# Patient Record
Sex: FEMALE | Race: WHITE | HISPANIC OR LATINO
[De-identification: names, ages, dates, MRNs, and addresses within clinical notes are randomized per-mention and may not be internally consistent; named-entity substitution may affect disease eponyms.]

---
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
## 2019-03-04 ENCOUNTER — APPOINTMENT (OUTPATIENT)
Dept: PEDIATRIC HEMATOLOGY/ONCOLOGY | Facility: CLINIC | Age: 19
End: 2019-03-04
Payer: COMMERCIAL

## 2019-03-04 ENCOUNTER — OUTPATIENT (OUTPATIENT)
Dept: OUTPATIENT SERVICES | Age: 19
LOS: 1 days | Discharge: HOME | End: 2019-03-04

## 2019-03-04 VITALS
TEMPERATURE: 98.06 F | RESPIRATION RATE: 20 BRPM | WEIGHT: 159.61 LBS | BODY MASS INDEX: 25.96 KG/M2 | SYSTOLIC BLOOD PRESSURE: 129 MMHG | DIASTOLIC BLOOD PRESSURE: 53 MMHG | HEART RATE: 62 BPM | HEIGHT: 65.91 IN | OXYGEN SATURATION: 100 %

## 2019-03-04 PROCEDURE — 99214 OFFICE O/P EST MOD 30 MIN: CPT

## 2019-03-04 NOTE — PHYSICAL EXAM
[Neuro-onc exam] : PERRLA, EOMI, cranial nerves II-XII grossly intact, motor exam 5/5 throughout, sensory exam intact, normal patellar DTRs, no dysmetria, normal gait, no ataxia on tandem gait [Normal] : affect appropriate [100: Normal, no complaints, no evidence of disease.] : 100: Normal, no complaints, no evidence of disease. [de-identified] : hearing aids

## 2019-03-04 NOTE — REASON FOR VISIT
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

## 2019-03-04 NOTE — FAMILY HISTORY
[] :  [Age ___] : Age: [unfilled] [Healthy] : healthy [Full] : full sister [FreeTextEntry2] : elevated cholesterol

## 2019-03-13 ENCOUNTER — APPOINTMENT (OUTPATIENT)
Dept: MRI IMAGING | Facility: CLINIC | Age: 19
End: 2019-03-13
Payer: COMMERCIAL

## 2019-03-13 ENCOUNTER — OUTPATIENT (OUTPATIENT)
Dept: OUTPATIENT SERVICES | Facility: HOSPITAL | Age: 19
LOS: 1 days | End: 2019-03-13
Payer: COMMERCIAL

## 2019-03-13 DIAGNOSIS — C71.3 MALIGNANT NEOPLASM OF PARIETAL LOBE: ICD-10-CM

## 2019-03-13 DIAGNOSIS — Z00.00 ENCOUNTER FOR GENERAL ADULT MEDICAL EXAMINATION WITHOUT ABNORMAL FINDINGS: ICD-10-CM

## 2019-03-13 PROCEDURE — 72156 MRI NECK SPINE W/O & W/DYE: CPT | Mod: 26

## 2019-03-13 PROCEDURE — 70553 MRI BRAIN STEM W/O & W/DYE: CPT | Mod: 26

## 2019-03-13 PROCEDURE — 70553 MRI BRAIN STEM W/O & W/DYE: CPT

## 2019-03-13 PROCEDURE — 72156 MRI NECK SPINE W/O & W/DYE: CPT

## 2019-03-13 PROCEDURE — A9585: CPT

## 2019-03-14 ENCOUNTER — APPOINTMENT (OUTPATIENT)
Dept: MRI IMAGING | Facility: CLINIC | Age: 19
End: 2019-03-14

## 2019-06-14 ENCOUNTER — FORM ENCOUNTER (OUTPATIENT)
Age: 19
End: 2019-06-14

## 2019-06-15 ENCOUNTER — OUTPATIENT (OUTPATIENT)
Dept: OUTPATIENT SERVICES | Facility: HOSPITAL | Age: 19
LOS: 1 days | End: 2019-06-15
Payer: COMMERCIAL

## 2019-06-15 ENCOUNTER — APPOINTMENT (OUTPATIENT)
Dept: MRI IMAGING | Facility: CLINIC | Age: 19
End: 2019-06-15
Payer: COMMERCIAL

## 2019-06-15 DIAGNOSIS — H91.90 UNSPECIFIED HEARING LOSS, UNSPECIFIED EAR: ICD-10-CM

## 2019-06-15 PROCEDURE — 70553 MRI BRAIN STEM W/O & W/DYE: CPT | Mod: 26

## 2019-06-15 PROCEDURE — A9585: CPT

## 2019-06-15 PROCEDURE — 70553 MRI BRAIN STEM W/O & W/DYE: CPT

## 2019-07-12 ENCOUNTER — APPOINTMENT (OUTPATIENT)
Dept: PREADMISSION TESTING | Facility: CLINIC | Age: 19
End: 2019-07-12

## 2019-07-17 ENCOUNTER — OTHER (OUTPATIENT)
Age: 19
End: 2019-07-17

## 2019-07-23 ENCOUNTER — APPOINTMENT (OUTPATIENT)
Dept: MRI IMAGING | Facility: CLINIC | Age: 19
End: 2019-07-23

## 2019-07-26 ENCOUNTER — APPOINTMENT (OUTPATIENT)
Dept: PREADMISSION TESTING | Facility: CLINIC | Age: 19
End: 2019-07-26
Payer: COMMERCIAL

## 2019-07-26 VITALS
SYSTOLIC BLOOD PRESSURE: 112 MMHG | DIASTOLIC BLOOD PRESSURE: 76 MMHG | WEIGHT: 155.43 LBS | TEMPERATURE: 98.06 F | HEART RATE: 66 BPM | HEIGHT: 65.67 IN | BODY MASS INDEX: 25.28 KG/M2

## 2019-07-26 DIAGNOSIS — G93.0 CEREBRAL CYSTS: ICD-10-CM

## 2019-07-26 DIAGNOSIS — Z01.818 ENCOUNTER FOR OTHER PREPROCEDURAL EXAMINATION: ICD-10-CM

## 2019-07-26 PROCEDURE — 99205 OFFICE O/P NEW HI 60 MIN: CPT

## 2019-07-29 LAB
ANION GAP SERPL CALC-SCNC: 15 MMOL/L
APTT BLD: 36.1 SEC
APTT IMM NP/PRE NP PPP: NORMAL
BASOPHILS # BLD AUTO: 0.05 K/UL
BASOPHILS NFR BLD AUTO: 1.1 %
BUN SERPL-MCNC: 10 MG/DL
CALCIUM SERPL-MCNC: 9.6 MG/DL
CHLORIDE SERPL-SCNC: 102 MMOL/L
CO2 SERPL-SCNC: 24 MMOL/L
CREAT SERPL-MCNC: 0.7 MG/DL
EOSINOPHIL # BLD AUTO: 0.14 K/UL
EOSINOPHIL NFR BLD AUTO: 3 %
GLUCOSE SERPL-MCNC: 82 MG/DL
HCG SERPL-MCNC: <1 MIU/ML
HCT VFR BLD CALC: 42.4 %
HGB BLD-MCNC: 13.4 G/DL
IMM GRANULOCYTES NFR BLD AUTO: 0.2 %
INR PPP: 1.03 RATIO
LYMPHOCYTES # BLD AUTO: 1.98 K/UL
LYMPHOCYTES NFR BLD AUTO: 42.1 %
MAN DIFF?: NORMAL
MCHC RBC-ENTMCNC: 31.6 GM/DL
MCHC RBC-ENTMCNC: 31.6 PG
MCV RBC AUTO: 100 FL
MONOCYTES # BLD AUTO: 0.37 K/UL
MONOCYTES NFR BLD AUTO: 7.9 %
NEUTROPHILS # BLD AUTO: 2.15 K/UL
NEUTROPHILS NFR BLD AUTO: 45.7 %
NPP NORMAL POOLED PLASMA: NORMAL SECS
PLATELET # BLD AUTO: 283 K/UL
POTASSIUM SERPL-SCNC: 4.7 MMOL/L
PT BLD: 11.7 SEC
RBC # BLD: 4.24 M/UL
RBC # FLD: 12.3 %
SODIUM SERPL-SCNC: 141 MMOL/L
WBC # FLD AUTO: 4.7 K/UL

## 2019-08-02 LAB
ABO + RH PNL BLD: NORMAL
BLD GP AB SCN SERPL QL: NORMAL

## 2019-08-05 ENCOUNTER — TRANSCRIPTION ENCOUNTER (OUTPATIENT)
Age: 19
End: 2019-08-05

## 2019-08-06 ENCOUNTER — INPATIENT (INPATIENT)
Age: 19
LOS: 1 days | Discharge: HOME | End: 2019-08-08
Attending: NEUROLOGICAL SURGERY | Admitting: NEUROLOGICAL SURGERY
Payer: COMMERCIAL

## 2019-08-06 VITALS
TEMPERATURE: 99 F | HEART RATE: 80 BPM | RESPIRATION RATE: 16 BRPM | HEIGHT: 65.67 IN | WEIGHT: 155.43 LBS | SYSTOLIC BLOOD PRESSURE: 108 MMHG | OXYGEN SATURATION: 97 % | DIASTOLIC BLOOD PRESSURE: 72 MMHG

## 2019-08-06 DIAGNOSIS — C71.9 MALIGNANT NEOPLASM OF BRAIN, UNSPECIFIED: Chronic | ICD-10-CM

## 2019-08-06 DIAGNOSIS — Z48.811 ENCOUNTER FOR SURGICAL AFTERCARE FOLLOWING SURGERY ON THE NERVOUS SYSTEM: ICD-10-CM

## 2019-08-06 DIAGNOSIS — G93.0 CEREBRAL CYSTS: ICD-10-CM

## 2019-08-06 DIAGNOSIS — H91.93 UNSPECIFIED HEARING LOSS, BILATERAL: ICD-10-CM

## 2019-08-06 DIAGNOSIS — C71.9 MALIGNANT NEOPLASM OF BRAIN, UNSPECIFIED: ICD-10-CM

## 2019-08-06 LAB
BLD GP AB SCN SERPL QL: NEGATIVE — SIGNIFICANT CHANGE UP
CLARITY CSF: SIGNIFICANT CHANGE UP
COLOR CSF: SIGNIFICANT CHANGE UP
GLUCOSE CSF-MCNC: 4 MG/DL — LOW (ref 40–70)
GRAM STN CSF: SIGNIFICANT CHANGE UP
LYMPHOCYTES # CSF: 75 % — SIGNIFICANT CHANGE UP
MONOCYTES # CSF: 25 % — SIGNIFICANT CHANGE UP
NRBC NFR CSF: 1 CELL/UL — SIGNIFICANT CHANGE UP (ref 0–5)
PROT CSF-MCNC: 4.7 MG/DL — LOW (ref 15–40)
RBC # CSF: 811 CELL/UL — HIGH (ref 0–0)
RH IG SCN BLD-IMP: POSITIVE — SIGNIFICANT CHANGE UP
SPECIMEN SOURCE: SIGNIFICANT CHANGE UP
TOTAL CELLS COUNTED, SPINAL FLUID: 8 CELLS — SIGNIFICANT CHANGE UP
XANTHOCHROMIA: SIGNIFICANT CHANGE UP

## 2019-08-06 PROCEDURE — 99291 CRITICAL CARE FIRST HOUR: CPT

## 2019-08-06 RX ORDER — DEXAMETHASONE 0.5 MG/5ML
4 ELIXIR ORAL EVERY 6 HOURS
Refills: 0 | Status: DISCONTINUED | OUTPATIENT
Start: 2019-08-06 | End: 2019-08-07

## 2019-08-06 RX ORDER — ONDANSETRON 8 MG/1
4 TABLET, FILM COATED ORAL ONCE
Refills: 0 | Status: DISCONTINUED | OUTPATIENT
Start: 2019-08-06 | End: 2019-08-06

## 2019-08-06 RX ORDER — ACETAMINOPHEN 500 MG
650 TABLET ORAL EVERY 6 HOURS
Refills: 0 | Status: DISCONTINUED | OUTPATIENT
Start: 2019-08-06 | End: 2019-08-06

## 2019-08-06 RX ORDER — CEFAZOLIN SODIUM 1 G
2000 VIAL (EA) INJECTION EVERY 8 HOURS
Refills: 0 | Status: DISCONTINUED | OUTPATIENT
Start: 2019-08-06 | End: 2019-08-07

## 2019-08-06 RX ORDER — ACETAMINOPHEN 500 MG
650 TABLET ORAL EVERY 6 HOURS
Refills: 0 | Status: DISCONTINUED | OUTPATIENT
Start: 2019-08-06 | End: 2019-08-08

## 2019-08-06 RX ORDER — FENTANYL CITRATE 50 UG/ML
25 INJECTION INTRAVENOUS
Refills: 0 | Status: DISCONTINUED | OUTPATIENT
Start: 2019-08-06 | End: 2019-08-06

## 2019-08-06 RX ORDER — OXYCODONE HYDROCHLORIDE 5 MG/1
5 TABLET ORAL EVERY 4 HOURS
Refills: 0 | Status: DISCONTINUED | OUTPATIENT
Start: 2019-08-06 | End: 2019-08-08

## 2019-08-06 RX ORDER — FENTANYL CITRATE 50 UG/ML
50 INJECTION INTRAVENOUS
Refills: 0 | Status: DISCONTINUED | OUTPATIENT
Start: 2019-08-06 | End: 2019-08-06

## 2019-08-06 RX ADMIN — OXYCODONE HYDROCHLORIDE 5 MILLIGRAM(S): 5 TABLET ORAL at 21:21

## 2019-08-06 RX ADMIN — Medication 650 MILLIGRAM(S): at 23:52

## 2019-08-06 RX ADMIN — Medication 650 MILLIGRAM(S): at 19:30

## 2019-08-06 RX ADMIN — OXYCODONE HYDROCHLORIDE 5 MILLIGRAM(S): 5 TABLET ORAL at 20:51

## 2019-08-06 RX ADMIN — Medication 200 MILLIGRAM(S): at 23:09

## 2019-08-06 RX ADMIN — Medication 650 MILLIGRAM(S): at 19:02

## 2019-08-06 RX ADMIN — Medication 4 MILLIGRAM(S): at 22:20

## 2019-08-06 NOTE — PROGRESS NOTE PEDS - SUBJECTIVE AND OBJECTIVE BOX
Interval/Overnight Events: s/p fenestration of cyst in OR. EBL 50 ml.    ===========================RESPIRATORY==========================  RR: 19 (08-06-19 @ 20:27) (10 - 30)  SpO2: 99% (08-06-19 @ 20:27) (95% - 99%)    Respiratory Support: RA  [x] Airway Clearance Discussed  Extubation Readiness:  [x ] Not Applicable     [ ] Discussed and Assessed  Comments:    =========================CARDIOVASCULAR========================  HR: 92 (08-06-19 @ 20:27) (80 - 112)  BP: 109/60 (08-06-19 @ 20:27) (108/72 - 118/50)  Patient Care Access: PIV  Comments:    =====================HEMATOLOGY/ONCOLOGY=====================  Transfusions:	[ ] PRBC	[ ] Platelets	[ ] FFP		[ ] Cryoprecipitate  DVT Prophylaxis: SCDs  Comments:    ========================INFECTIOUS DISEASE=======================  T(C): 36.4 (08-06-19 @ 20:27), Max: 37.4 (08-06-19 @ 12:18)  T(F): 97.5 (08-06-19 @ 20:27), Max: 98.6 (08-06-19 @ 18:05)  [ ] Cooling Hawkins being used. Target Temperature:    ceFAZolin  IV Intermittent - Peds 2000 milliGRAM(s) IV Intermittent every 8 hours    ==================FLUIDS/ELECTROLYTES/NUTRITION=================  I&O's Summary    06 Aug 2019 07:01  -  06 Aug 2019 22:31  --------------------------------------------------------  IN: 200 mL / OUT: 0 mL / NET: 200 mL    Diet: Regular  [ ] NGT		[ ] NDT		[ ] GT		[ ] GJT    ==========================NEUROLOGY===========================  [ ] SBS:		[ ] PIPPA-1:	[ ] BIS:	[ ] CAPD:  acetaminophen   Oral Tab/Cap - Peds. 650 milliGRAM(s) Oral every 6 hours  oxyCODONE   IR Oral Tab/Cap - Peds 5 milliGRAM(s) Oral every 4 hours PRN  [x] Adequacy of sedation and pain control has been assessed and adjusted  Comments:    OTHER MEDICATIONS:  dexamethasone IV Intermittent - Pediatric 4 milliGRAM(s) IV Intermittent every 6 hours    =========================PATIENT CARE==========================  [ ] There are pressure ulcers/areas of breakdown that are being addressed.  [x] Preventative measures are being taken to decrease risk for skin breakdown.  [x] Necessity of urinary, arterial, and venous catheters discussed    =========================PHYSICAL EXAM=========================  GENERAL: In no acute distress  RESPIRATORY: Lungs clear to auscultation bilaterally. Good aeration. No rales, rhonchi, retractions or wheezing. Effort even and unlabored.  CARDIOVASCULAR: Regular rate and rhythm. Normal S1/S2. No murmurs, rubs, or gallop. Capillary refill < 2 seconds. Distal pulses 2+ and equal.  ABDOMEN: Soft, non-distended. Bowel sounds present. No palpable hepatosplenomegaly.  SKIN: No rash. Wound at surgical site CDI.  EXTREMITIES: Warm and well perfused. No gross extremity deformities.  NEUROLOGIC: Alert and oriented. CN 2-7, 10-12 intact. Patient is deaf. Normal motor and sensation.    ===============================================================  Parent/Guardian is at the bedside:	[x ] Yes	[ ] No  Patient and Parent/Guardian updated as to the progress/plan of care:	[x ] Yes	[ ] No    [ x] The patient remains in critical and unstable condition, and requires ICU care and monitoring, total critical care time spent by myself, the attending physician was 35 minutes, excluding procedure time.  [ ] The patient is improving but requires continued monitoring and adjustment of therapy

## 2019-08-06 NOTE — ASU PATIENT PROFILE, PEDIATRIC - PSH
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

## 2019-08-06 NOTE — ASU PATIENT PROFILE, PEDIATRIC - PMH
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

## 2019-08-06 NOTE — PATIENT PROFILE PEDIATRIC. - VISION (WITH CORRECTIVE LENSES IF THE PATIENT USUALLY WEARS THEM):
uses glasses/Normal vision: sees adequately in most situations; can see medication labels, newsprint

## 2019-08-06 NOTE — PATIENT PROFILE PEDIATRIC. - ABILITY TO HEAR (WITH HEARING AID OR HEARING APPLIANCE IF NORMALLY USED):
uses B/L hearing aids/Mildly to Moderately Impaired: difficulty hearing in some environments or speaker may need to increase volume or speak distinctly

## 2019-08-06 NOTE — PROGRESS NOTE PEDS - ASSESSMENT
18 yof with congenital deafness and ependymoma resection in 2012, here because of headaches and a cyst at the site of the previous tumor resection. S/p fenestration of cyst on 8/6.    Stable from a resp and HD standpoint. Cont to monitor.  ADAT.  Pain control with Tylenol, Oxycodone  Decadron taper per neurosurgery  Neuro checks  Periop antibiotics  Following with neurosurgery.

## 2019-08-06 NOTE — PROGRESS NOTE PEDS - ASSESSMENT
17 y/o f pmhx ependymoma s/p GTR 8/2012 s/p endoscopic resection of intraventricular arachnoid cyst. Post op neurologically intact. tolerating po fluids.

## 2019-08-06 NOTE — PROGRESS NOTE PEDS - SUBJECTIVE AND OBJECTIVE BOX
SUBJECTIVE EVENTS: PO check Note:  patient reports feeling well, has mild headache otherwise no complaints.     Vital Signs Last 24 Hrs  T(C): 37 (06 Aug 2019 19:00), Max: 37.4 (06 Aug 2019 12:18)  T(F): 98.6 (06 Aug 2019 19:00), Max: 98.6 (06 Aug 2019 18:05)  HR: 84 (06 Aug 2019 19:15) (80 - 112)  BP: 118/50 (06 Aug 2019 19:00) (108/72 - 118/50)  BP(mean): 65 (06 Aug 2019 19:00) (64 - 71)  RR: 16 (06 Aug 2019 19:15) (10 - 30)  SpO2: 97% (06 Aug 2019 19:15) (95% - 97%)      PHYSICAL EXAM:  Awake Alert Age Appropriate, fc  PERRL, EOMI, No facial droop, Tongue midline  b/l hearing aids.  Normal Tone 5/5 strength equally      INCISION: clean, dry, no active dc.      DIET: advance to regular as tolerated.       MEDICATIONS  (STANDING):  ceFAZolin  IV Intermittent - Peds 2000 milliGRAM(s) IV Intermittent every 8 hours  dexamethasone IV Intermittent - Pediatric 4 milliGRAM(s) IV Intermittent every 6 hours    MEDICATIONS  (PRN):  acetaminophen   Oral Tab/Cap - Peds. 650 milliGRAM(s) Oral every 6 hours PRN Temp greater or equal to 38 C (100.4 F), Mild Pain (1 - 3)  acetaminophen   Oral Tab/Cap - Peds. 650 milliGRAM(s) Oral every 6 hours PRN Mild Pain (1 - 3)  fentaNYL    IV Intermittent - Peds 25 MICROGram(s) IV Intermittent every 10 minutes PRN Moderate Pain (4 - 6)  fentaNYL    IV Intermittent - Peds 50 MICROGram(s) IV Intermittent every 10 minutes PRN Severe Pain (7 - 10)  ondansetron IV Intermittent - Peds 4 milliGRAM(s) IV Intermittent once PRN Nausea and/or Vomiting  oxyCODONE   IR Oral Tab/Cap - Peds 5 milliGRAM(s) Oral every 4 hours PRN Moderate Pain (4 - 6)

## 2019-08-06 NOTE — BRIEF OPERATIVE NOTE - NSICDXBRIEFPROCEDURE_GEN_ALL_CORE_FT
PROCEDURES:  Percutaneous endoscopic removal of solid matter from ventricular septum 06-Aug-2019 17:50:25  Elvin Estrada

## 2019-08-06 NOTE — PROGRESS NOTE PEDS - PROBLEM SELECTOR PLAN 1
- pacu to picu.  - q1 neuro checks in picu.  - ancef x 24hrs.  - decadron 4q6h taper over 5 days.  - Mri brain w/wo tomorrow.  - pain control.  - advance diet as tolerated.     d/w attending.

## 2019-08-07 ENCOUNTER — TRANSCRIPTION ENCOUNTER (OUTPATIENT)
Age: 19
End: 2019-08-07

## 2019-08-07 PROCEDURE — 99232 SBSQ HOSP IP/OBS MODERATE 35: CPT

## 2019-08-07 PROCEDURE — 70450 CT HEAD/BRAIN W/O DYE: CPT | Mod: 26,GC

## 2019-08-07 RX ORDER — ACETAMINOPHEN 500 MG
2 TABLET ORAL
Qty: 0 | Refills: 0 | DISCHARGE
Start: 2019-08-07

## 2019-08-07 RX ORDER — OXYCODONE HYDROCHLORIDE 5 MG/1
1 TABLET ORAL
Qty: 10 | Refills: 0
Start: 2019-08-07

## 2019-08-07 RX ORDER — DEXAMETHASONE 0.5 MG/5ML
4 ELIXIR ORAL EVERY 6 HOURS
Refills: 0 | Status: DISCONTINUED | OUTPATIENT
Start: 2019-08-07 | End: 2019-08-07

## 2019-08-07 RX ORDER — DEXAMETHASONE 0.5 MG/5ML
4 ELIXIR ORAL EVERY 6 HOURS
Refills: 0 | Status: COMPLETED | OUTPATIENT
Start: 2019-08-07 | End: 2019-08-07

## 2019-08-07 RX ORDER — DEXAMETHASONE 0.5 MG/5ML
3 ELIXIR ORAL EVERY 8 HOURS
Refills: 0 | Status: DISCONTINUED | OUTPATIENT
Start: 2019-08-08 | End: 2019-08-08

## 2019-08-07 RX ORDER — DEXAMETHASONE 0.5 MG/5ML
1 ELIXIR ORAL
Qty: 24 | Refills: 0
Start: 2019-08-07

## 2019-08-07 RX ADMIN — OXYCODONE HYDROCHLORIDE 5 MILLIGRAM(S): 5 TABLET ORAL at 17:47

## 2019-08-07 RX ADMIN — OXYCODONE HYDROCHLORIDE 5 MILLIGRAM(S): 5 TABLET ORAL at 02:25

## 2019-08-07 RX ADMIN — Medication 4 MILLIGRAM(S): at 11:24

## 2019-08-07 RX ADMIN — Medication 4 MILLIGRAM(S): at 22:09

## 2019-08-07 RX ADMIN — Medication 4 MILLIGRAM(S): at 03:43

## 2019-08-07 RX ADMIN — Medication 650 MILLIGRAM(S): at 23:34

## 2019-08-07 RX ADMIN — Medication 200 MILLIGRAM(S): at 06:40

## 2019-08-07 RX ADMIN — Medication 650 MILLIGRAM(S): at 11:50

## 2019-08-07 RX ADMIN — Medication 650 MILLIGRAM(S): at 18:00

## 2019-08-07 RX ADMIN — Medication 650 MILLIGRAM(S): at 00:22

## 2019-08-07 RX ADMIN — Medication 650 MILLIGRAM(S): at 06:21

## 2019-08-07 RX ADMIN — OXYCODONE HYDROCHLORIDE 5 MILLIGRAM(S): 5 TABLET ORAL at 01:55

## 2019-08-07 RX ADMIN — Medication 650 MILLIGRAM(S): at 06:51

## 2019-08-07 RX ADMIN — Medication 650 MILLIGRAM(S): at 12:00

## 2019-08-07 RX ADMIN — Medication 4 MILLIGRAM(S): at 17:07

## 2019-08-07 RX ADMIN — OXYCODONE HYDROCHLORIDE 5 MILLIGRAM(S): 5 TABLET ORAL at 07:53

## 2019-08-07 RX ADMIN — Medication 650 MILLIGRAM(S): at 17:27

## 2019-08-07 NOTE — PROGRESS NOTE PEDS - ASSESSMENT
19 y/o F pmhx ependymoma s/p GTR 8/2012 s/p endoscopic resection of intraventricular arachnoid cyst POD#1. Post op neurologically intact    PLAN:   - q1 neuro checks  - ancef x 24hrs  - decadron 4q6h taper over 5 days - 4q6 x 1 day, 3q8 x 1 day, 2 q12 x 1 day, 1q12 x 1 day, 1QD x 1 day   - Mri brain w/wo today  - advance diet as tolerated

## 2019-08-07 NOTE — DISCHARGE NOTE PROVIDER - NSDCCPCAREPLAN_GEN_ALL_CORE_FT
PRINCIPAL DISCHARGE DIAGNOSIS  Diagnosis: Cyst of brain  Assessment and Plan of Treatment: Cyst of brain PRINCIPAL DISCHARGE DIAGNOSIS  Diagnosis: Cyst of brain  Assessment and Plan of Treatment: 1. Remove top surgical dressing on post operative day 3 unless it was removed by the surgical team prior to your discharge. Incision should be left uncovered after day 3.   2. Begin showering with shampoo on post operative day 4. Avoid long soaks and do not submerge incision in bathtub. Regular shower only and allow soap and water to run over the incision. Pat incision area dry with clean towel- do not scrub. Please shower regularly to ensure incision stays clean to avoid post operative infections.   3. Notify your surgeon if you notice increased redness, drainage or you notice incision area opening.   4. Return to ER immediately for high fevers, severe headache, vomiting, lethargy or  weakness  5. Please call your neurosurgeon following discharge to make follow up appointment in 1 week after discharge unless otherwise specified. See Contact information below.   6. Prescription Post operative medication, if applicable,  are sent to [facility](unless another pharmacy specified)- [facility] is located in [facility] [employer] [facility]. All post operative prescrptions should be picked up before departing the hospital  7. Ambulate as tolerate. Continue with all "activities of daily living". Avoid strenuous activity or lifting more than 10 pounds until cleared for additional activity at your follow up appointment  8. Do not return to work or school until cleared by your neurosurgeon at your follow up visit unless specified to you during your hospital stay

## 2019-08-07 NOTE — DISCHARGE NOTE PROVIDER - NSDCFUADDINST_GEN_ALL_CORE_FT
1. Surgical dressing will fall off on it's own in time. Do not scratch or pick at dressing, be careful when brushing hair.   2. Begin showering with shampoo on post operative day 4. Avoid long soaks and do not submerge incision in water. Regular shower only and allow soap and water to run over the incision. Pat incision area dry with clean towel- do not scrub. Please shower regularly to ensure incision stays clean to avoid post operative infections.   3. Notify your surgeon if you notice increased redness, drainage or you notice incision area opening.   4. Return to ER immediately for high fevers, severe headache, vomiting, lethargy or weakness  5. Please call your neurosurgeon following discharge to make follow up appointment in 1 week after discharge unless otherwise specified. See contact information.  6. Prescription post operative medication has been sent to [email or web address]. [email or web address] is located in [facility] [employer] [facility]. All post operative prescriptions should be picked up before departing the hospital.  7. Ambulate as tolerate. Continue with all "activities of daily living." Avoid strenuous activity or lifting more than 10 pounds until cleared for additional activity at your follow up appointment.  8. Do not return to work or school until cleared by your neurosurgeon at your follow up visit unless specified to you during your hospital stay

## 2019-08-07 NOTE — PROGRESS NOTE PEDS - ASSESSMENT
18 yof with congenital deafness and ependymoma resection in 2012, here because of headaches and a cyst at the site of the previous tumor resection. S/p fenestration of cyst on 8/6.    Stable from a resp and HD standpoint. Cont to monitor.  ADAT.  Pain control with Tylenol, Oxycodone  Decadron taper per neurosurgery  Neuro checks  Periop antibiotics  Following with neurosurgery. 18 yof with congenital deafness and ependymoma resection in 2012, here because of headaches and a cyst at the site of the previous tumor resection. S/p fenestration of cyst on 8/6.    Plan:  Doing well post craniotomy  Continue neurochecks  MRI today  OOB and ambulate  Encourage PO  Continue to monitor for pain and give pain meds as needed  Neurosurgery co-management  anticipate possible transfer to floor later today.

## 2019-08-07 NOTE — PROGRESS NOTE PEDS - SUBJECTIVE AND OBJECTIVE BOX
POST ANESTHESIA EVALUATION    18y Female POSTOP DAY 1 S/P     MENTAL STATUS: Patient participation [  x] Awake     [  ] Arousable     [  ] Sedated    AIRWAY PATENCY: [ x ] Satisfactory  [  ] Other:     Vital Signs Last 24 Hrs  T(C): 36.7 (07 Aug 2019 07:48), Max: 37.4 (06 Aug 2019 12:18)  T(F): 98 (07 Aug 2019 07:48), Max: 98.6 (06 Aug 2019 18:05)  HR: 60 (07 Aug 2019 07:48) (53 - 112)  BP: 107/58 (07 Aug 2019 07:48) (100/40 - 118/50)  BP(mean): 74 (07 Aug 2019 07:48) (58 - 75)  RR: 19 (07 Aug 2019 07:48) (10 - 30)  SpO2: 98% (07 Aug 2019 07:48) (95% - 99%)  I&O's Summary    06 Aug 2019 07:01  -  07 Aug 2019 07:00  --------------------------------------------------------  IN: 1034 mL / OUT: 1950 mL / NET: -916 mL          NAUSEA/ VOMITTING:  [ x ] NONE  [  ] CONTROLLED [  ] OTHER     PAIN: [x  ] CONTROLLED WITH CURRENT REGIMEN  [  ] OTHER    [ x ] NO APPARENT ANESTHESIA COMPLICATIONS      Comments:

## 2019-08-07 NOTE — DISCHARGE NOTE PROVIDER - NSDCCPTREATMENT_GEN_ALL_CORE_FT
PRINCIPAL PROCEDURE  Procedure: Percutaneous endoscopic removal of solid matter from ventricular septum  Findings and Treatment:

## 2019-08-07 NOTE — DISCHARGE NOTE PROVIDER - HOSPITAL COURSE
19 y/o F pmhx ependymoma s/p GTR 8/2012 s/p endoscopic resection of intraventricular arachnoid cyst on 8/6/19. Post op neurologically intact, tolerating PO intake, p/o MRI shows _____. Patient stable for discharge home. 17 y/o F pmhx ependymoma s/p GTR 8/2012 s/p endoscopic resection of intraventricular arachnoid cyst on 8/6/19. Post op neurologically intact, tolerating PO intake, p/o MRI showed decreased size of the surgically fenestrated cyst compared to preoperative imaging . Patient stable for discharge home.

## 2019-08-07 NOTE — PROGRESS NOTE PEDS - SUBJECTIVE AND OBJECTIVE BOX
Interval/Overnight Events:    VITAL SIGNS:  T(C): 36.7 (08-07-19 @ 07:48), Max: 37.4 (08-06-19 @ 12:18)  HR: 60 (08-07-19 @ 07:48) (53 - 112)  BP: 107/58 (08-07-19 @ 07:48) (100/40 - 118/50)  ABP: --  ABP(mean): --  RR: 19 (08-07-19 @ 07:48) (10 - 30)  SpO2: 98% (08-07-19 @ 07:48) (95% - 99%)  CVP(mm Hg): --  End-Tidal CO2:          ===========================RESPIRATORY==========================  [ ] FiO2: ___ 	[ ] Heliox: ____ 		[ ] BiPAP: ___   [ ] NC: __  Liters			[ ] HFNC: __ 	Liters, FiO2: __  [ ] Mechanical Ventilation:   [ ] Inhaled Nitric Oxide:          [ ] Extubation Readiness Assessed  Comments:    =========================CARDIOVASCULAR========================  NIRS:      Chest Tube Output: ___ in 24 hours, ___ in last 12 hours     [ ] Right     [ ] Left    [ ] Mediastinal    Cardiac Rhythm:	[x] NSR		[ ] Other:    [ ] Central Venous Line			                         Placed:   [ ] Arterial Line		                                                 Placed:   [ ] PICC:				[ ] Broviac		                 [ ] Mediport  Comments:    =====================HEMATOLOGY/ONCOLOGY=====================  Transfusions: 	[ ] PRBC	[ ] Platelets	[ ] FFP		[ ] Cryoprecipitate  DVT Prophylaxis:      Comments:    ========================INFECTIOUS DISEASE=======================  [ ] Cooling [place] being used. Target Temperature:     RECENT CULTURES:  08-06 @ 23:15 CEREBRAL SPINAL FLUID               ==================FLUIDS/ELECTROLYTES/NUTRITION=================  I&O's Summary    06 Aug 2019 07:01  -  07 Aug 2019 07:00  --------------------------------------------------------  IN: 1034 mL / OUT: 1950 mL / NET: -916 mL      Daily Weight Gm: 91102 (06 Aug 2019 20:27)    Diet:	[ ] Regular	[ ] Soft		[ ] Clears   	[ ] NPO  .	        [ ] Other:  .	        [ ] NGT		[ ] NDT		[ ] GT		[ ] GJT          [ ] Urinary Catheter, Date Placed:   Comments:    ==========================NEUROLOGY===========================  [ ] SBS:		[ ] PIPPA-1:	[ ] BIS:	[ ] CAPD:  [ ] EVD set at: ___ , Drainage in last 24 hours: ___ ml    acetaminophen   Oral Tab/Cap - Peds. 650 milliGRAM(s) Oral every 6 hours  oxyCODONE   IR Oral Tab/Cap - Peds 5 milliGRAM(s) Oral every 4 hours PRN    [x] Adequacy of sedation and pain control has been assessed and adjusted  Comments:    OTHER MEDICATIONS:  ceFAZolin  IV Intermittent - Peds 2000 milliGRAM(s) IV Intermittent every 8 hours  dexamethasone IV Intermittent - Pediatric 4 milliGRAM(s) IV Intermittent every 6 hours      =========================PATIENT CARE==========================  [ ] There are pressure ulcers/areas of breakdown that are being addressed.  [x] Preventative measures are being taken to decrease risk for skin breakdown.  [x] Necessity of urinary, arterial, and venous catheters discussed    =========================PHYSICAL EXAM=========================  GENERAL:   RESPIRATORY:   CARDIOVASCULAR:   ABDOMEN:   SKIN:   EXTREMITIES:   NEUROLOGIC:     ===============================================================    IMAGING STUDIES:    Parent/Guardian is at the bedside:	[ ] Yes	[ ] No  Patient and Parent/Guardian updated as to the progress/plan of care:	[ ] Yes	[ ] No    [ ] The patient remains in critical and unstable condition, and requires ICU care and monitoring.  Total critical care time spent by the attending physician was _____ minutes, excluding procedure time.    [ ] The patient is improving but requires continued monitoring and adjustment of therapy.  Total critical care time spent by the attending physician at bedside was _____ minutes, excluding procedure time. 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  s/p  craniotomy and fenestration.  Afebrile overnight.  No emesis.  Tolerating regular diet.    VITAL SIGNS:  T(C): 36.7 (08-07-19 @ 07:48), Max: 37.4 (08-06-19 @ 12:18)  HR: 60 (08-07-19 @ 07:48) (53 - 112)  BP: 107/58 (08-07-19 @ 07:48) (100/40 - 118/50)  ABP: --  ABP(mean): --  RR: 19 (08-07-19 @ 07:48) (10 - 30)  SpO2: 98% (08-07-19 @ 07:48) (95% - 99%)  CVP(mm Hg): --  End-Tidal CO2:          ===========================RESPIRATORY==========================  [ ] FiO2: RA    [ ] Extubation Readiness Assessed  Comments:    =========================CARDIOVASCULAR========================  NIRS:      Chest Tube Output: ___ in 24 hours, ___ in last 12 hours     [ ] Right     [ ] Left    [ ] Mediastinal    Cardiac Rhythm:	[x] NSR		[ ] Other:    [ ] Central Venous Line			                         Placed:   [ ] Arterial Line		                                                 Placed:   [ ] PICC:				[ ] Broviac		                 [ ] Mediport  Comments:    =====================HEMATOLOGY/ONCOLOGY=====================  Transfusions: 	[ ] PRBC	[ ] Platelets	[ ] FFP		[ ] Cryoprecipitate  DVT Prophylaxis: moderate risk, venodynes      Comments:    ========================INFECTIOUS DISEASE=======================  [ ] Cooling [place] being used. Target Temperature:     RECENT CULTURES:  08-06 @ 23:15 CEREBRAL SPINAL FLUID       Gram Stain Spinal Fluid:   WBC^White Blood Cells  QNTY CELLS IN GRAM STAIN: NO CELLS SEEN  NOS^No Organisms Seen (08.06.19 @ 23:15)            ==================FLUIDS/ELECTROLYTES/NUTRITION=================  I&O's Summary    06 Aug 2019 07:01  -  07 Aug 2019 07:00  --------------------------------------------------------  IN: 1034 mL / OUT: 1950 mL / NET: -916 mL      Daily Weight Gm: 43336 (06 Aug 2019 20:27)    Diet:	[ x] Regular	[ ] Soft		[ ] Clears   	[ ] NPO  .	        [ ] Other:  .	        [ ] NGT		[ ] NDT		[ ] GT		[ ] GJT          [ ] Urinary Catheter, Date Placed:   Comments:    ==========================NEUROLOGY===========================  [ ] SBS:	0	[ ] Pain = 1-5.  Relieved with oxycodone    acetaminophen   Oral Tab/Cap - Peds. 650 milliGRAM(s) Oral every 6 hours  oxyCODONE   IR Oral Tab/Cap - Peds 5 milliGRAM(s) Oral every 4 hours PRN    [x] Adequacy of sedation and pain control has been assessed and adjusted  Comments:    OTHER MEDICATIONS:  ceFAZolin  IV Intermittent - Peds 2000 milliGRAM(s) IV Intermittent every 8 hours  dexamethasone IV Intermittent - Pediatric 4 milliGRAM(s) IV Intermittent every 6 hours      =========================PATIENT CARE==========================  [ ] There are pressure ulcers/areas of breakdown that are being addressed.  [x] Preventative measures are being taken to decrease risk for skin breakdown.  [x] Necessity of urinary, arterial, and venous catheters discussed    =========================PHYSICAL EXAM=========================  GENERAL:   RESPIRATORY:   CARDIOVASCULAR:   ABDOMEN:   SKIN:   EXTREMITIES:   NEUROLOGIC:     ===============================================================    IMAGING STUDIES:    Parent/Guardian is at the bedside:	[ ] Yes	[ ] No  Patient and Parent/Guardian updated as to the progress/plan of care:	[ ] Yes	[ ] No    [ ] The patient remains in critical and unstable condition, and requires ICU care and monitoring.  Total critical care time spent by the attending physician was _____ minutes, excluding procedure time.    [ ] The patient is improving but requires continued monitoring and adjustment of therapy.  Total critical care time spent by the attending physician at bedside was _____ minutes, excluding procedure time. 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  s/p  craniotomy and fenestration.  Afebrile overnight.  No emesis.  Tolerating regular diet.    VITAL SIGNS:  T(C): 36.7 (08-07-19 @ 07:48), Max: 37.4 (08-06-19 @ 12:18)  HR: 60 (08-07-19 @ 07:48) (53 - 112)  BP: 107/58 (08-07-19 @ 07:48) (100/40 - 118/50)  ABP: --  ABP(mean): --  RR: 19 (08-07-19 @ 07:48) (10 - 30)  SpO2: 98% (08-07-19 @ 07:48) (95% - 99%)  CVP(mm Hg): --  End-Tidal CO2:          ===========================RESPIRATORY==========================  [ ] FiO2: RA    [ ] Extubation Readiness Assessed  Comments:    =========================CARDIOVASCULAR========================  NIRS:      Chest Tube Output: ___ in 24 hours, ___ in last 12 hours     [ ] Right     [ ] Left    [ ] Mediastinal    Cardiac Rhythm:	[x] NSR		[ ] Other:    [ ] Central Venous Line			                         Placed:   [ ] Arterial Line		                                                 Placed:   [ ] PICC:				[ ] Broviac		                 [ ] Mediport  Comments:    =====================HEMATOLOGY/ONCOLOGY=====================  Transfusions: 	[ ] PRBC	[ ] Platelets	[ ] FFP		[ ] Cryoprecipitate  DVT Prophylaxis: moderate risk, venodynes      Comments:    ========================INFECTIOUS DISEASE=======================  [ ] Cooling [place] being used. Target Temperature:     RECENT CULTURES:  08-06 @ 23:15 CEREBRAL SPINAL FLUID       Gram Stain Spinal Fluid:   WBC^White Blood Cells  QNTY CELLS IN GRAM STAIN: NO CELLS SEEN  NOS^No Organisms Seen (08.06.19 @ 23:15)            ==================FLUIDS/ELECTROLYTES/NUTRITION=================  I&O's Summary    06 Aug 2019 07:01  -  07 Aug 2019 07:00  --------------------------------------------------------  IN: 1034 mL / OUT: 1950 mL / NET: -916 mL      Daily Weight Gm: 03693 (06 Aug 2019 20:27)    Diet:	[ x] Regular	[ ] Soft		[ ] Clears   	[ ] NPO  .	        [ ] Other:  .	        [ ] NGT		[ ] NDT		[ ] GT		[ ] GJT          [ ] Urinary Catheter, Date Placed:   Comments:    ==========================NEUROLOGY===========================  [ ] SBS:	0	[ ] Pain = 1-5.  Relieved with oxycodone    acetaminophen   Oral Tab/Cap - Peds. 650 milliGRAM(s) Oral every 6 hours  oxyCODONE   IR Oral Tab/Cap - Peds 5 milliGRAM(s) Oral every 4 hours PRN    [x] Adequacy of sedation and pain control has been assessed and adjusted  Comments:    OTHER MEDICATIONS:  ceFAZolin  IV Intermittent - Peds 2000 milliGRAM(s) IV Intermittent every 8 hours  dexamethasone IV Intermittent - Pediatric 4 milliGRAM(s) IV Intermittent every 6 hours      =========================PATIENT CARE==========================  [ ] There are pressure ulcers/areas of breakdown that are being addressed.  [x] Preventative measures are being taken to decrease risk for skin breakdown.  [x] Necessity of urinary, arterial, and venous catheters discussed    =========================PHYSICAL EXAM=========================  GENERAL: awake, alert, in no acute distress  RESPIRATORY: good air entry, coarse BS, rhonchi  CARDIOVASCULAR: regular  ABDOMEN: soft  SKIN: incision, clean and dry  EXTREMITIES: warm, well perfused, brisk refill  NEUROLOGIC: non-focal exam    ===============================================================    IMAGING STUDIES:    Parent/Guardian is at the bedside:	[ ] Yes	[ ] No  Patient and Parent/Guardian updated as to the progress/plan of care:	[ ] Yes	[ ] No    [ ] The patient remains in critical and unstable condition, and requires ICU care and monitoring.  Total critical care time spent by the attending physician was _____ minutes, excluding procedure time.    [ ] The patient is improving but requires continued monitoring and adjustment of therapy.  Total critical care time spent by the attending physician at bedside was _____ minutes, excluding procedure time.

## 2019-08-07 NOTE — PROGRESS NOTE PEDS - SUBJECTIVE AND OBJECTIVE BOX
NEUROSURGERY NOTE   NATHAN MARTINEZ / [id number] / 08-07-19 @ 07:41    PAST 24hr EVENTS: no acute overnight events, patient doing well p/o    PHYSICAL EXAM:   Vital Signs Last 24 Hrs  T(C): 36.4 (07 Aug 2019 05:00), Max: 37.4 (06 Aug 2019 12:18)  T(F): 97.5 (07 Aug 2019 05:00), Max: 98.6 (06 Aug 2019 18:05)  HR: 53 (07 Aug 2019 05:00) (53 - 112)  BP: 100/49 (07 Aug 2019 05:00) (100/40 - 118/50)  BP(mean): 64 (07 Aug 2019 05:00) (58 - 75)  RR: 24 (07 Aug 2019 05:00) (10 - 30)  SpO2: 95% (07 Aug 2019 05:00) (95% - 99%)    Awake Alert Age Appropriate, fc  PERRL, EOMI, No facial droop, Tongue midline  b/l hearing aids.  Normal Tone 5/5 strength equally  INCISION: clean, dry, no active dc.    I&O's Summary    06 Aug 2019 07:01  -  07 Aug 2019 07:00  --------------------------------------------------------  IN: 1034 mL / OUT: 1950 mL / NET: -916 mL    MEDICATIONS  (STANDING):  acetaminophen   Oral Tab/Cap - Peds. 650 milliGRAM(s) Oral every 6 hours  ceFAZolin  IV Intermittent - Peds 2000 milliGRAM(s) IV Intermittent every 8 hours  dexamethasone IV Intermittent - Pediatric 4 milliGRAM(s) IV Intermittent every 6 hours    MEDICATIONS  (PRN):  oxyCODONE   IR Oral Tab/Cap - Peds 5 milliGRAM(s) Oral every 4 hours PRN Moderate Pain (4 - 6)

## 2019-08-07 NOTE — DISCHARGE NOTE PROVIDER - CARE PROVIDER_API CALL
Chandler Beckham (MD)  Neurological Surgery; Pediatric Neurological Surgery  [address], [address]  [place], [place] [id number]  Phone: [phone]  Fax: [phone]  Follow Up Time:

## 2019-08-08 ENCOUNTER — APPOINTMENT (OUTPATIENT)
Dept: PEDIATRIC HEMATOLOGY/ONCOLOGY | Facility: CLINIC | Age: 19
End: 2019-08-08

## 2019-08-08 ENCOUNTER — TRANSCRIPTION ENCOUNTER (OUTPATIENT)
Age: 19
End: 2019-08-08

## 2019-08-08 VITALS — OXYGEN SATURATION: 99 % | RESPIRATION RATE: 21 BRPM | HEART RATE: 67 BPM

## 2019-08-08 PROCEDURE — 70553 MRI BRAIN STEM W/O & W/DYE: CPT | Mod: 26

## 2019-08-08 PROCEDURE — 99238 HOSP IP/OBS DSCHRG MGMT 30/<: CPT

## 2019-08-08 RX ADMIN — Medication 650 MILLIGRAM(S): at 06:37

## 2019-08-08 RX ADMIN — Medication 650 MILLIGRAM(S): at 14:04

## 2019-08-08 RX ADMIN — Medication 650 MILLIGRAM(S): at 00:25

## 2019-08-08 RX ADMIN — Medication 3 MILLIGRAM(S): at 09:15

## 2019-08-08 NOTE — PROGRESS NOTE PEDS - SUBJECTIVE AND OBJECTIVE BOX
Interval/Overnight Events:  Congenital hearing loss, history of ependymoma resected in 2012, admitted s/p craniotomy for fenestration of arachnoid cyst.  POD #2.  Had her MRI this morning.  Has been OOB and ambulatory.  Afebrile.  No new events.    VITAL SIGNS:  T(C): 36.4 (08-08-19 @ 08:22), Max: 36.8 (08-07-19 @ 17:48)  HR: 58 (08-08-19 @ 08:22) (47 - 70)  BP: 112/65 (08-08-19 @ 08:22) (97/58 - 114/59)  ABP: --  ABP(mean): --  RR: 18 (08-08-19 @ 08:22) (18 - 22)  SpO2: 98% (08-08-19 @ 08:22) (96% - 99%)  CVP(mm Hg): --  End-Tidal CO2:          ===========================RESPIRATORY==========================  [ ] FiO2: RA    [ ] Extubation Readiness Assessed  Comments:    =========================CARDIOVASCULAR========================  NIRS:      Chest Tube Output: ___ in 24 hours, ___ in last 12 hours     [ ] Right     [ ] Left    [ ] Mediastinal    Cardiac Rhythm:	[x] NSR		[ ] Other:    [ ] Central Venous Line			                         Placed:   [ ] Arterial Line		                                                 Placed:   [ ] PICC:				[ ] Broviac		                 [ ] Mediport  Comments:    =====================HEMATOLOGY/ONCOLOGY=====================  Transfusions: 	[ ] PRBC	[ ] Platelets	[ ] FFP		[ ] Cryoprecipitate  DVT Prophylaxis: low risk, OOB and ambulatory      Comments:    ========================INFECTIOUS DISEASE=======================  [ ] Cooling [place] being used. Target Temperature:     RECENT CULTURES:  08-06 @ 23:15 CEREBRAL SPINAL FLUID     Culture - CSF:   NO GROWTH - PRELIMINARY RESULTS  NO ORGANISMS ISOLATED AT 24 HOURS (08.06.19 @ 23:15)              ==================FLUIDS/ELECTROLYTES/NUTRITION=================  I&O's Summary    07 Aug 2019 07:01  -  08 Aug 2019 07:00  --------------------------------------------------------  IN: 480 mL / OUT: 800 mL / NET: -320 mL      Daily Weight Gm: 03776 (06 Aug 2019 20:27)    Diet:	[x ] Regular	[ ] Soft		[ ] Clears   	[ ] NPO  .	        [ ] Other:  .	        [ ] NGT		[ ] NDT		[ ] GT		[ ] GJT          [ ] Urinary Catheter, Date Placed:   Comments:    ==========================NEUROLOGY===========================  [ ] SBS:	0   Denies pain    acetaminophen   Oral Tab/Cap - Peds. 650 milliGRAM(s) Oral every 6 hours  oxyCODONE   IR Oral Tab/Cap - Peds 5 milliGRAM(s) Oral every 4 hours PRN    [x] Adequacy of sedation and pain control has been assessed and adjusted  Comments:    OTHER MEDICATIONS:  dexamethasone IV Intermittent - Pediatric 3 milliGRAM(s) IV Intermittent every 8 hours  change to 2 mg q 12 tomorrow   Will finish course on 8/11      =========================PATIENT CARE==========================  [ ] There are pressure ulcers/areas of breakdown that are being addressed.  [x] Preventative measures are being taken to decrease risk for skin breakdown.  [x] Necessity of urinary, arterial, and venous catheters discussed    =========================PHYSICAL EXAM=========================  GENERAL:   RESPIRATORY:   CARDIOVASCULAR:   ABDOMEN:   SKIN:   EXTREMITIES:   NEUROLOGIC:     ===============================================================    IMAGING STUDIES:    Parent/Guardian is at the bedside:	[ ] Yes	[ ] No  Patient and Parent/Guardian updated as to the progress/plan of care:	[ ] Yes	[ ] No    [ ] The patient remains in critical and unstable condition, and requires ICU care and monitoring.  Total critical care time spent by the attending physician was _____ minutes, excluding procedure time.    [ ] The patient is improving but requires continued monitoring and adjustment of therapy.  Total critical care time spent by the attending physician at bedside was _____ minutes, excluding procedure time. Interval/Overnight Events:  Congenital hearing loss, history of ependymoma resected in 2012, admitted s/p craniotomy for fenestration of arachnoid cyst.  POD #2.  Had her MRI this morning.  Has been OOB and ambulatory.  Afebrile.  No new events.    VITAL SIGNS:  T(C): 36.4 (08-08-19 @ 08:22), Max: 36.8 (08-07-19 @ 17:48)  HR: 58 (08-08-19 @ 08:22) (47 - 70)  BP: 112/65 (08-08-19 @ 08:22) (97/58 - 114/59)  ABP: --  ABP(mean): --  RR: 18 (08-08-19 @ 08:22) (18 - 22)  SpO2: 98% (08-08-19 @ 08:22) (96% - 99%)  CVP(mm Hg): --  End-Tidal CO2:          ===========================RESPIRATORY==========================  [ ] FiO2: RA    [ ] Extubation Readiness Assessed  Comments:    =========================CARDIOVASCULAR========================  NIRS:      Chest Tube Output: ___ in 24 hours, ___ in last 12 hours     [ ] Right     [ ] Left    [ ] Mediastinal    Cardiac Rhythm:	[x] NSR		[ ] Other:    [ ] Central Venous Line			                         Placed:   [ ] Arterial Line		                                                 Placed:   [ ] PICC:				[ ] Broviac		                 [ ] Mediport  Comments:    =====================HEMATOLOGY/ONCOLOGY=====================  Transfusions: 	[ ] PRBC	[ ] Platelets	[ ] FFP		[ ] Cryoprecipitate  DVT Prophylaxis: low risk, OOB and ambulatory      Comments:    ========================INFECTIOUS DISEASE=======================  [ ] Cooling [place] being used. Target Temperature:     RECENT CULTURES:  08-06 @ 23:15 CEREBRAL SPINAL FLUID     Culture - CSF:   NO GROWTH - PRELIMINARY RESULTS  NO ORGANISMS ISOLATED AT 24 HOURS (08.06.19 @ 23:15)              ==================FLUIDS/ELECTROLYTES/NUTRITION=================  I&O's Summary    07 Aug 2019 07:01  -  08 Aug 2019 07:00  --------------------------------------------------------  IN: 480 mL / OUT: 800 mL / NET: -320 mL      Daily Weight Gm: 49770 (06 Aug 2019 20:27)    Diet:	[x ] Regular	[ ] Soft		[ ] Clears   	[ ] NPO  .	        [ ] Other:  .	        [ ] NGT		[ ] NDT		[ ] GT		[ ] GJT          [ ] Urinary Catheter, Date Placed:   Comments:    ==========================NEUROLOGY===========================  [ ] SBS:	0   Denies pain    acetaminophen   Oral Tab/Cap - Peds. 650 milliGRAM(s) Oral every 6 hours  oxyCODONE   IR Oral Tab/Cap - Peds 5 milliGRAM(s) Oral every 4 hours PRN    [x] Adequacy of sedation and pain control has been assessed and adjusted  Comments:    OTHER MEDICATIONS:  dexamethasone IV Intermittent - Pediatric 3 milliGRAM(s) IV Intermittent every 8 hours  change to 2 mg q 12 tomorrow   Will finish course on 8/11      =========================PATIENT CARE==========================  [ ] There are pressure ulcers/areas of breakdown that are being addressed.  [x] Preventative measures are being taken to decrease risk for skin breakdown.  [x] Necessity of urinary, arterial, and venous catheters discussed    =========================PHYSICAL EXAM=========================  GENERAL: awake, alert, in no acute distress  RESPIRATORY: good air entry, coarse BS, rhonchi  CARDIOVASCULAR: regular  ABDOMEN: soft  SKIN: incision, clean and dry  EXTREMITIES: warm, well perfused, brisk refill  NEUROLOGIC: non-focal exam    ===============================================================    IMAGING STUDIES:    Parent/Guardian is at the bedside:	[x ] Yes	[ ] No  Patient and Parent/Guardian updated as to the progress/plan of care:	[x ] Yes	[ ] No    [ ] The patient remains in critical and unstable condition, and requires ICU care and monitoring.  Total critical care time spent by the attending physician was _____ minutes, excluding procedure time.    [ x] The patient is improving but requires continued monitoring and adjustment of therapy.  Total critical care time spent by the attending physician at bedside was 35 minutes, excluding procedure time.

## 2019-08-08 NOTE — DISCHARGE NOTE NURSING/CASE MANAGEMENT/SOCIAL WORK - NSDCDPATPORTLINK_GEN_ALL_CORE
You can access the [facility], offered by [facility], by registering with the following website: [email or web address]

## 2019-08-08 NOTE — PROGRESS NOTE PEDS - ASSESSMENT
18 yof with congenital deafness and ependymoma resection in 2012, here because of headaches and a cyst at the site of the previous tumor resection. S/p fenestration of cyst on 8/6.    Plan:  Doing well post craniotomy  Continue neurochecks  s/p MRI today  OOB and ambulating  Follow up reading  Anticipate discharge to home today

## 2019-08-08 NOTE — PROGRESS NOTE PEDS - SUBJECTIVE AND OBJECTIVE BOX
SUBJECTIVE:     Vital Signs Last 24 Hrs  T(C): 36.2 (08-08-19 @ 05:43), Max: 36.8 (08-07-19 @ 17:48)  T(F): 97.1 (08-08-19 @ 05:43), Max: 98.2 (08-07-19 @ 17:48)  HR: 47 (08-08-19 @ 05:43) (47 - 70)  BP: 97/58 (08-08-19 @ 05:43) (97/58 - 114/59)  BP(mean): 73 (08-08-19 @ 05:43) (68 - 79)  RR: 21 (08-08-19 @ 05:43) (18 - 22)  SpO2: 98% (08-08-19 @ 05:43) (96% - 99%)    PHYSICAL EXAM:    Constitutional: No Acute Distress     Neurological: Awake, alert, oriented to person, place and time, speech clear and fluent, face equal, tongue midline, briskly following commands, no drift, moves all extremities with 5/5 strength, sensation intact to light touch throughout, pupils 4mm and reactive bilaterally, extraocular movements intact, no nystagmus    Incision: CDI            LABS:             08-07 @ 07:01  -  08-08 @ 07:00  --------------------------------------------------------  IN: 480 mL / OUT: 800 mL / NET: -320 mL        IMAGING:     MEDICATIONS:  Antibiotics:    Neuro:  acetaminophen   Oral Tab/Cap - Peds. 650 milliGRAM(s) Oral every 6 hours  oxyCODONE   IR Oral Tab/Cap - Peds 5 milliGRAM(s) Oral every 4 hours PRN Moderate Pain (4 - 6)    Cardiac:    Pulm:    GI/:    Other:   dexamethasone IV Intermittent - Pediatric 3 milliGRAM(s) IV Intermittent every 8 hours        DIET:

## 2019-08-08 NOTE — PROGRESS NOTE PEDS - ASSESSMENT
19 y/o F pmhx ependymoma s/p GTR 8/2012 s/p endoscopic resection of intraventricular arachnoid cyst POD#2. Post op neurological baseline    PLAN:   - q1 neuro checks  - decadron taper over 5 days - 4q6 x 1 day, 3q8 x 1 day, 2 q12 x 1 day, 1q12 x 1 day, 1QD x 1 day   - Mri brain w/wo today  - advance diet as tolerated  - Dispo planning

## 2019-08-12 LAB — BACTERIA CSF CULT: SIGNIFICANT CHANGE UP

## 2019-08-27 PROBLEM — H91.90 UNSPECIFIED HEARING LOSS, UNSPECIFIED EAR: Chronic | Status: ACTIVE | Noted: 2019-07-26

## 2019-08-27 PROBLEM — C71.9 MALIGNANT NEOPLASM OF BRAIN, UNSPECIFIED: Chronic | Status: ACTIVE | Noted: 2019-07-26

## 2019-08-27 PROBLEM — G93.0 CEREBRAL CYSTS: Chronic | Status: ACTIVE | Noted: 2019-07-26

## 2019-08-27 PROBLEM — Z87.898 PERSONAL HISTORY OF OTHER SPECIFIED CONDITIONS: Chronic | Status: ACTIVE | Noted: 2019-07-26

## 2019-08-27 PROBLEM — N92.6 IRREGULAR MENSTRUATION, UNSPECIFIED: Chronic | Status: ACTIVE | Noted: 2019-07-26

## 2019-09-10 ENCOUNTER — OUTPATIENT (OUTPATIENT)
Dept: OUTPATIENT SERVICES | Age: 19
LOS: 1 days | Discharge: HOME | End: 2019-09-10

## 2019-09-10 ENCOUNTER — APPOINTMENT (OUTPATIENT)
Dept: PEDIATRIC HEMATOLOGY/ONCOLOGY | Facility: CLINIC | Age: 19
End: 2019-09-10
Payer: COMMERCIAL

## 2019-09-10 VITALS
HEIGHT: 66.5 IN | DIASTOLIC BLOOD PRESSURE: 55 MMHG | RESPIRATION RATE: 20 BRPM | BODY MASS INDEX: 25.45 KG/M2 | SYSTOLIC BLOOD PRESSURE: 119 MMHG | WEIGHT: 160.28 LBS | TEMPERATURE: 97.88 F | HEART RATE: 62 BPM

## 2019-09-10 DIAGNOSIS — H91.90 UNSPECIFIED HEARING LOSS, UNSPECIFIED EAR: ICD-10-CM

## 2019-09-10 DIAGNOSIS — C71.3 MALIGNANT NEOPLASM OF PARIETAL LOBE: ICD-10-CM

## 2019-09-10 DIAGNOSIS — C71.9 MALIGNANT NEOPLASM OF BRAIN, UNSPECIFIED: Chronic | ICD-10-CM

## 2019-09-10 PROCEDURE — 99215 OFFICE O/P EST HI 40 MIN: CPT

## 2019-09-10 NOTE — REASON FOR VISIT
[Brain Tumor] : brain tumor [Follow-Up Visit] : a follow-up visit for [Mother] : mother [Patient] : patient [[employer] ] : [employer]   [FreeTextEntry2] : Ependymoma [TWNoteComboBox1] : [place] [FreeTextEntry1] : [id number]

## 2019-09-10 NOTE — CONSULT LETTER
[Consult Letter:] : I had the pleasure of evaluating your patient, [unfilled]. [Dear  ___] : Dear  [unfilled], [Sincerely,] : Sincerely, [Please see my note below.] : Please see my note below. [DrAndra  ___] : Dr. GALVAN [FreeTextEntry2] : Brdaford Cisneros M.D.\par [address]\[place] [address]\par Tel.#: [phone]\par Fax #: [phone] [FreeTextEntry3] : Brooks Cat MD \par Chief, [facility]\par  of Pediatrics\par [facility] [employer] [facility]\par

## 2019-09-10 NOTE — HISTORY OF PRESENT ILLNESS
[de-identified] : Justina presented at 11 years of age in 2012 with history of right periventricular brain lesion with 1 week history of increased headaches.  MRI demonstrated interval enlargement of the existing lesion.  At that time she had no LOC, seizures or focal motor or sensory loss.  She underwent GTR of the lesion resection of the mass on 8/16/12.  Pathology consistent with ependymoma.  Also with history of hearing loss for which she wears hearing aids and attended school for the dear.  She has not had any treatment and is monitored w/ surveillance MRI's.  Became symptomatic from arachnoid cyst and had it fenestrated August 2019. [de-identified] : Graduated HS, Going to college in January.  \par Had surgery for removal of arachnoid cyst August 8, 2019\par September 3rd had several episodes of breathing fast (though could get breaths in easily) followed by dizziness. From first to last episode time lapse was approximately 24 hours.  During day was able to do most tasks, though did not go running.\par Before surgery has had several episodes of dizziness, especsially when lifting heavy weights, also had some dizziness when running a mile.  (ran track and would often run one mile, so this is unexpected).   Another time, a few months prior to surgery, was doing exercise in basement, went to take a bath, and became light-headed and had to grab herself to prevent falling down.  Each time she says she was well hydrated.\par \par  number [id number]\par \par

## 2019-09-10 NOTE — RESULTS/DATA
[FreeTextEntry1] : MRI BRAIN WOW CON \par \par PROCEDURE DATE: 09/28/2017 \par \par INTERPRETATION: Exam: MRI brain with contrast \par \par History: Ependymoma resection. Follow-up. \par \par Technique: Sagittal T1-weighted, axial T2-weighted, coronal T1-weighted, \par coronal FLAIR, axial flair, axial high resolution T2-weighted, axial \par diffusion-weighted, gadolinium enhanced axial MPRAGE, axial T1-weighted \par images of the brain, axial and coronal gadolinium enhanced T1-weighted \par images of the temporal bones were acquired. The contrast material was \par intravenously administered Gadavist (7.5 mL). \par \par Comparison: MRI brain from 5/16/2015. \par \par Findings: The right parietal resection cavity is stable. No \par pseudomeningocele formation is present. There is stable expected enlargement \par of the right lateral ventricle, particularly posteriorly. Encephalomalacia \par and gliosis is seen about the right parietal resection cavity. Following \par administration of intravenous contrast material no nodular enhancement is \par seen to suggest tumor recurrence at the operative site. There is stable \par linear enhancement traversing the resection cavity. No new or remote \par enhancement is appreciated. The ventricles are stable in size. The basal \par cisterns are adequately patent. The small arachnoid cyst at the anteromedial \par aspect of the right middle cranial fossa is unchanged. Normal vascular \par signal voids are maintained intracranially. Diffusion-weighted images \par demonstrate no abnormalities. The T1 shortening of neurohypophysis is normal \par in position. The corpus callosum remains diminutive posteriorly. The \par extracranial tissues are unremarkable. \par \par Impression: Stable appearance of the brain compared with the MRI from \par 5/16/2015. No evidence of tumor recurrence is present. \par \par \par KORGUN KORAL \par This document has been electronically signed. Sep 28 2017 5:11PM

## 2019-09-10 NOTE — PHYSICAL EXAM
[Neuro-onc exam] : PERRLA, EOMI, cranial nerves II-XII grossly intact, motor exam 5/5 throughout, sensory exam intact, normal patellar DTRs, no dysmetria, normal gait, no ataxia on tandem gait [Normal] : affect appropriate [100: Normal, no complaints, no evidence of disease.] : 100: Normal, no complaints, no evidence of disease. [de-identified] : Hearing aid  [de-identified] : no change with holding of breath

## 2019-10-28 ENCOUNTER — TRANSCRIPTION ENCOUNTER (OUTPATIENT)
Age: 19
End: 2019-10-28

## 2019-10-28 ENCOUNTER — INPATIENT (INPATIENT)
Age: 19
LOS: 2 days | Discharge: HOME | End: 2019-10-31
Attending: NEUROLOGICAL SURGERY | Admitting: NEUROLOGICAL SURGERY
Payer: COMMERCIAL

## 2019-10-28 VITALS
WEIGHT: 163.14 LBS | SYSTOLIC BLOOD PRESSURE: 112 MMHG | TEMPERATURE: 98 F | RESPIRATION RATE: 18 BRPM | HEART RATE: 78 BPM | OXYGEN SATURATION: 98 % | DIASTOLIC BLOOD PRESSURE: 67 MMHG

## 2019-10-28 DIAGNOSIS — G91.9 HYDROCEPHALUS, UNSPECIFIED: ICD-10-CM

## 2019-10-28 DIAGNOSIS — R51 HEADACHE: ICD-10-CM

## 2019-10-28 DIAGNOSIS — C71.9 MALIGNANT NEOPLASM OF BRAIN, UNSPECIFIED: Chronic | ICD-10-CM

## 2019-10-28 LAB
ALBUMIN SERPL ELPH-MCNC: 4.9 G/DL — SIGNIFICANT CHANGE UP (ref 3.3–5)
ALP SERPL-CCNC: 80 U/L — SIGNIFICANT CHANGE UP (ref 40–120)
ALT FLD-CCNC: 12 U/L — SIGNIFICANT CHANGE UP (ref 4–33)
ANION GAP SERPL CALC-SCNC: 15 MMO/L — HIGH (ref 7–14)
AST SERPL-CCNC: 20 U/L — SIGNIFICANT CHANGE UP (ref 4–32)
BASOPHILS # BLD AUTO: 0.03 K/UL — SIGNIFICANT CHANGE UP (ref 0–0.2)
BASOPHILS NFR BLD AUTO: 0.5 % — SIGNIFICANT CHANGE UP (ref 0–2)
BILIRUB SERPL-MCNC: 0.4 MG/DL — SIGNIFICANT CHANGE UP (ref 0.2–1.2)
BLD GP AB SCN SERPL QL: NEGATIVE — SIGNIFICANT CHANGE UP
BUN SERPL-MCNC: 12 MG/DL — SIGNIFICANT CHANGE UP (ref 7–23)
CALCIUM SERPL-MCNC: 9.5 MG/DL — SIGNIFICANT CHANGE UP (ref 8.4–10.5)
CHLORIDE SERPL-SCNC: 101 MMOL/L — SIGNIFICANT CHANGE UP (ref 98–107)
CO2 SERPL-SCNC: 23 MMOL/L — SIGNIFICANT CHANGE UP (ref 22–31)
CREAT SERPL-MCNC: 0.68 MG/DL — SIGNIFICANT CHANGE UP (ref 0.5–1.3)
EOSINOPHIL # BLD AUTO: 0.06 K/UL — SIGNIFICANT CHANGE UP (ref 0–0.5)
EOSINOPHIL NFR BLD AUTO: 1.1 % — SIGNIFICANT CHANGE UP (ref 0–6)
GLUCOSE SERPL-MCNC: 98 MG/DL — SIGNIFICANT CHANGE UP (ref 70–99)
HCG SERPL-ACNC: < 5 MIU/ML — SIGNIFICANT CHANGE UP
HCT VFR BLD CALC: 39.1 % — SIGNIFICANT CHANGE UP (ref 34.5–45)
HGB BLD-MCNC: 12.8 G/DL — SIGNIFICANT CHANGE UP (ref 11.5–15.5)
IMM GRANULOCYTES NFR BLD AUTO: 0.4 % — SIGNIFICANT CHANGE UP (ref 0–1.5)
INR BLD: 1.08 — SIGNIFICANT CHANGE UP (ref 0.88–1.17)
LYMPHOCYTES # BLD AUTO: 1.08 K/UL — SIGNIFICANT CHANGE UP (ref 1–3.3)
LYMPHOCYTES # BLD AUTO: 19.3 % — SIGNIFICANT CHANGE UP (ref 13–44)
MCHC RBC-ENTMCNC: 31.6 PG — SIGNIFICANT CHANGE UP (ref 27–34)
MCHC RBC-ENTMCNC: 32.7 % — SIGNIFICANT CHANGE UP (ref 32–36)
MCV RBC AUTO: 96.5 FL — SIGNIFICANT CHANGE UP (ref 80–100)
MONOCYTES # BLD AUTO: 0.28 K/UL — SIGNIFICANT CHANGE UP (ref 0–0.9)
MONOCYTES NFR BLD AUTO: 5 % — SIGNIFICANT CHANGE UP (ref 2–14)
NEUTROPHILS # BLD AUTO: 4.13 K/UL — SIGNIFICANT CHANGE UP (ref 1.8–7.4)
NEUTROPHILS NFR BLD AUTO: 73.7 % — SIGNIFICANT CHANGE UP (ref 43–77)
NRBC # FLD: 0 K/UL — SIGNIFICANT CHANGE UP (ref 0–0)
PLATELET # BLD AUTO: 275 K/UL — SIGNIFICANT CHANGE UP (ref 150–400)
PMV BLD: 10.8 FL — SIGNIFICANT CHANGE UP (ref 7–13)
POTASSIUM SERPL-MCNC: 3.2 MMOL/L — LOW (ref 3.5–5.3)
POTASSIUM SERPL-SCNC: 3.2 MMOL/L — LOW (ref 3.5–5.3)
PROT SERPL-MCNC: 7.9 G/DL — SIGNIFICANT CHANGE UP (ref 6–8.3)
PROTHROM AB SERPL-ACNC: 12.4 SEC — SIGNIFICANT CHANGE UP (ref 9.8–13.1)
RBC # BLD: 4.05 M/UL — SIGNIFICANT CHANGE UP (ref 3.8–5.2)
RBC # FLD: 11.9 % — SIGNIFICANT CHANGE UP (ref 10.3–14.5)
RH IG SCN BLD-IMP: POSITIVE — SIGNIFICANT CHANGE UP
SODIUM SERPL-SCNC: 139 MMOL/L — SIGNIFICANT CHANGE UP (ref 135–145)
WBC # BLD: 5.6 K/UL — SIGNIFICANT CHANGE UP (ref 3.8–10.5)
WBC # FLD AUTO: 5.6 K/UL — SIGNIFICANT CHANGE UP (ref 3.8–10.5)

## 2019-10-28 PROCEDURE — 99291 CRITICAL CARE FIRST HOUR: CPT

## 2019-10-28 PROCEDURE — 70553 MRI BRAIN STEM W/O & W/DYE: CPT | Mod: 26

## 2019-10-28 PROCEDURE — 70450 CT HEAD/BRAIN W/O DYE: CPT | Mod: 26

## 2019-10-28 RX ORDER — LEVETIRACETAM 250 MG/1
1500 TABLET, FILM COATED ORAL EVERY 12 HOURS
Refills: 0 | Status: DISCONTINUED | OUTPATIENT
Start: 2019-10-28 | End: 2019-10-28

## 2019-10-28 RX ORDER — DEXAMETHASONE 0.5 MG/5ML
4 ELIXIR ORAL EVERY 6 HOURS
Refills: 0 | Status: DISCONTINUED | OUTPATIENT
Start: 2019-10-28 | End: 2019-10-30

## 2019-10-28 RX ORDER — LEVETIRACETAM 250 MG/1
750 TABLET, FILM COATED ORAL EVERY 12 HOURS
Refills: 0 | Status: DISCONTINUED | OUTPATIENT
Start: 2019-10-28 | End: 2019-10-30

## 2019-10-28 RX ORDER — MORPHINE SULFATE 50 MG/1
7 CAPSULE, EXTENDED RELEASE ORAL ONCE
Refills: 0 | Status: DISCONTINUED | OUTPATIENT
Start: 2019-10-28 | End: 2019-10-28

## 2019-10-28 RX ORDER — LEVETIRACETAM 250 MG/1
1500 TABLET, FILM COATED ORAL ONCE
Refills: 0 | Status: COMPLETED | OUTPATIENT
Start: 2019-10-28 | End: 2019-10-28

## 2019-10-28 RX ORDER — DEXAMETHASONE 0.5 MG/5ML
10 ELIXIR ORAL ONCE
Refills: 0 | Status: COMPLETED | OUTPATIENT
Start: 2019-10-28 | End: 2019-10-28

## 2019-10-28 RX ORDER — SODIUM CHLORIDE 9 MG/ML
1000 INJECTION, SOLUTION INTRAVENOUS
Refills: 0 | Status: DISCONTINUED | OUTPATIENT
Start: 2019-10-28 | End: 2019-10-29

## 2019-10-28 RX ORDER — ACETAMINOPHEN 500 MG
650 TABLET ORAL ONCE
Refills: 0 | Status: COMPLETED | OUTPATIENT
Start: 2019-10-28 | End: 2019-10-28

## 2019-10-28 RX ADMIN — SODIUM CHLORIDE 100 MILLILITER(S): 9 INJECTION, SOLUTION INTRAVENOUS at 16:35

## 2019-10-28 RX ADMIN — Medication 10 MILLIGRAM(S): at 17:15

## 2019-10-28 RX ADMIN — MORPHINE SULFATE 7 MILLIGRAM(S): 50 CAPSULE, EXTENDED RELEASE ORAL at 17:05

## 2019-10-28 RX ADMIN — MORPHINE SULFATE 7 MILLIGRAM(S): 50 CAPSULE, EXTENDED RELEASE ORAL at 17:35

## 2019-10-28 RX ADMIN — Medication 650 MILLIGRAM(S): at 16:15

## 2019-10-28 RX ADMIN — Medication 650 MILLIGRAM(S): at 15:45

## 2019-10-28 RX ADMIN — Medication 4 MILLIGRAM(S): at 23:00

## 2019-10-28 RX ADMIN — LEVETIRACETAM 400 MILLIGRAM(S): 250 TABLET, FILM COATED ORAL at 18:00

## 2019-10-28 NOTE — ED PEDIATRIC NURSE REASSESSMENT NOTE - COMFORT CARE
wait time explained/side rails up/darkened lights/plan of care explained
wait time explained/side rails up/plan of care explained

## 2019-10-28 NOTE — ED PEDIATRIC NURSE REASSESSMENT NOTE - GENERAL PATIENT STATE
family/SO at bedside/comfortable appearance/cooperative
comfortable appearance/cooperative/family/SO at bedside/smiling/interactive

## 2019-10-28 NOTE — ED PEDIATRIC NURSE NOTE - NSIMPLEMENTINTERV_GEN_ALL_ED
Implemented All Universal Safety Interventions:  [place] to call system. Call bell, personal items and telephone within reach. Instruct patient to call for assistance. Room bathroom lighting operational. Non-slip footwear when patient is off stretcher. Physically safe environment: no spills, clutter or unnecessary equipment. Stretcher in lowest position, wheels locked, appropriate side rails in place.

## 2019-10-28 NOTE — ED PROVIDER NOTE - ATTENDING CONTRIBUTION TO CARE
The resident's documentation has been prepared under my direction and personally reviewed by me in its entirety. I confirm that the note above accurately reflects all work, treatment, procedures, and medical decision making performed by me.  MARILIN Carver MD [facility] Attending

## 2019-10-28 NOTE — PATIENT PROFILE PEDIATRIC. - VISION (WITH CORRECTIVE LENSES IF THE PATIENT USUALLY WEARS THEM):
Normal vision: sees adequately in most situations; can see medication labels, newsprint/uses glasses; currently complains that unable to see out of left side of left eye

## 2019-10-28 NOTE — H&P PEDIATRIC - NSHPLABSRESULTS_GEN_ALL_CORE
< from: CT Head No Cont (10.28.19 @ 15:34) >    INTERPRETATION:  Clinical indication: History of tumor status post   resection of arachnoid cyst. Status post fenestration.    Multiple axial sections were performed from base of skull to vertex   without contrast enhancement. Coronal and sagittal reconstructions were   performed as well.    This exam is compared prior noncontrast head CT performed on August 6, 2019.    Postop changes compatible with a right parietal craniotomy is identified.   Dilatation of the right temporal region and occipital horn of the right   lateral ventricle seen. Surrounding transependymal flow is identified.   There is abnormal calcification seen involving the right splenium and   medial right parietal cortical region.    Increase in size of the left lateral ventricle seen.    Right to left shift (1.6 cm) seen.    Evaluation of the osseous structures with the appropriate window appears   normal    The visualized paranasal sinuses mastoid and middle ear regions appear   clear.    Impression: Dilated right temporal region and occipital right lateral   ventricle. This could be compatible with intraventricular arachnoid cyst   versus a trapped ventricle. Please correlate clinically.    < end of copied text >

## 2019-10-28 NOTE — ED PEDIATRIC NURSE NOTE - PSH
B[place]  [place] [facility] [place] Post Myringotomy with Insertion of Tube

## 2019-10-28 NOTE — ED PEDIATRIC NURSE REASSESSMENT NOTE - NS ED NURSE REASSESS COMMENT FT2
Pt awake and alert with mom at bedside. Breath sounds clear bilaterally, no wheezing rales or stridor noted. Pt states has a headache with a pain rating of 10/10. Neuro at bedside. Pending re-evaluation. Will continue to monitor.
Pt is alert awake, and appropriate, in no acute distress, o2 sat 100% on room air clear lungs b/l, no increased work of breathing, call bell within reach, lighting adequate in room, room free of clutter will continue to monitor awaiting admission
Pt is alert awake, and appropriate, in no acute distress, o2 sat 100% on room air clear lungs b/l, no increased work of breathing, call bell within reach, lighting adequate in room, room free of clutter will continue to monitor pt transported to MRI by Carlos MAGALLANES
Pt awake and alert with mom at bedside. Pt is well appearing and shows no signs of distress. IV inserted, flushes well, no redness or swelling. Labs sent, pending lab results. Pending re-evaluation. Will continue to monitor.
Pt awake and alert with mom at bedside. Pt denies pain, is well appearing and shows no signs of distress. IV flushes well, no redness or swelling. Per MD orders, more blood sent to lab, pending lab results. Pending re-evaluation. Will continue to monitor.

## 2019-10-28 NOTE — ED PEDIATRIC TRIAGE NOTE - CHIEF COMPLAINT QUOTE
Mother reports increased sleepiness and tiredness x2 weeks. Pt c/o  increased head pressure, dizziness, and loss of peripheral vision from left eye x2 days. Pt a+ox3, perrla, difficulty ambulating noted. HR verified by apical pulse.

## 2019-10-28 NOTE — H&P PEDIATRIC - ASSESSMENT
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

## 2019-10-28 NOTE — ED PROVIDER NOTE - PHYSICAL EXAMINATION
Const:  Alert and interactive,complains of headache  HEENT: Normocephalic, atraumatic; TMs WNL; Moist mucosa; Oropharynx clear; Neck supple, decreased peripheral vision of bilateral L hemispheres. PEERLA, EOMI,   Lymph: No significant lymphadenopathy  CV: Heart regular, normal S1/2, no murmurs; Extremities WWPx4  Pulm: Lungs clear to auscultation bilaterally  GI: Abdomen non-distended; No organomegaly, no tenderness, no masses  Skin: No rash noted  Neuro: Alert; unable to assess gait as patient is too dizzy to walk, however states she walked in the ED, strength u/e l/e 5/5 b/l. Const:  Alert and interactive,complains of headache  HEENT: Normocephalic, atraumatic; TMs WNL; Moist mucosa; Oropharynx clear; Neck supple, decreased peripheral vision of bilateral L hemispheres. PEERLA, EOMI,   Lymph: No significant lymphadenopathy  CV: Heart regular, normal S1/2, no murmurs; Extremities WWPx4  Pulm: Lungs clear to auscultation bilaterally  GI: Abdomen non-distended; No organomegaly, no tenderness, no masses  Skin: No rash noted  Neuro: Alert; unable to assess gait as patient is too dizzy to walk, however states she walked in the ED, strength u/e l/e 5/5 b/l. finger to nose testing wnl Const:  Alert and interactive, complains of headache  HEENT: Normocephalic, atraumatic; TMs WNL; Moist mucosa; Oropharynx clear; Neck supple, decreased peripheral vision of bilateral L hemispheres. PERRLA, EOMI,   Lymph: No significant lymphadenopathy  CV: Heart regular, normal S1/2, no murmurs; Extremities WWPx4  Pulm: Lungs clear to auscultation bilaterally  GI: Abdomen non-distended; No organomegaly, no tenderness, no masses  Skin: No rash noted  Neuro: Alert; unable to assess gait as patient is too dizzy to walk, however states she walked in the ED, strength u/e l/e 5/5 b/l. finger to nose testing wnl

## 2019-10-28 NOTE — ED PROVIDER NOTE - PSH
B[place]  [place] [facility] [place] Post Myringotomy with Insertion of Tube

## 2019-10-28 NOTE — PROGRESS NOTE PEDS - ASSESSMENT
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 CT revealed asymptomatically enlarged ventricles and MRI with concern for entrapped right ventricle. Neurosurgery on consult and planned for OR in am.     Resp:  Stable in room air     CV:  no issues    FENGI:  NPO    Heme:  CBC and coags for am OR    Neuro:  Decadron per neurosurgery  Keppra prophylaxis  will monitor for acute changes indicating acute rise in ICP. Low threshold to have neurosurgery evaluate frequently at bedside should there be an acute change in physical exam.     ID:  no issues

## 2019-10-28 NOTE — ED PROVIDER NOTE - NEUROLOGICAL, MLM
Alert and oriented, no focal deficits, no motor or sensory deficits. Sensation intact, motor 5/5, reflexes 2+, CN 2-12 intact, normal ambulation

## 2019-10-28 NOTE — ED PROVIDER NOTE - CLINICAL SUMMARY MEDICAL DECISION MAKING FREE TEXT BOX
19 y/o F pmhx ependymoma s/p resection 8/2012 s/p endoscopic resection of intraventricular arachnoid cyst on 8/6/19 presenting for new onset headaches and decreased vision on L side of L eye. Concern for brain pathology given history. Neurologically intact with normal vital signs. Will obtain CT head and discuss with NSX. MARILIN Carver MD [facility] Attending

## 2019-10-28 NOTE — H&P PEDIATRIC - HISTORY OF PRESENT ILLNESS
17 y/o F pmhx ependymoma s/p GTR 8/2012 s/p endoscopic resection of intraventricular arachnoid cyst on 8/6/19 presenting for new onset headaches and decreased vision on L side of L eye. She states that the headaches, currently 10/10 began on Saturday and have continued to worsen. The headaches are global and associated with dizziness. She now complains of blurry vision and trouble focusing. Mother states that she has been sleeping more and overall more tired.

## 2019-10-28 NOTE — H&P PEDIATRIC - PROBLEM SELECTOR PLAN 1
1. MRI Brain w./w.o  2. Preop for tomorrow, NPO after midnight with IVF  3. CBC, BMP, T&S, Coags, UPregnancy  4. Decadron  Case d/w Dr. Beckham

## 2019-10-28 NOTE — ED PROVIDER NOTE - EYES, MLM
Clear bilaterally, pupils equal, round and reactive to light. Notes peripheral vision on L eye is hazy

## 2019-10-28 NOTE — PROGRESS NOTE PEDS - SUBJECTIVE AND OBJECTIVE BOX
Interval/Overnight Events: admitted from the emergency room with concern for increased ICP    ===========================RESPIRATORY==========================  RR: 17 (10-28-19 @ 23:00) (15 - 18)  SpO2: 100% (10-28-19 @ 23:00) (98% - 100%)  End Tidal CO2:    Respiratory Support:   [ ] Inhaled Nitric Oxide:    [x] Airway Clearance Discussed  Extubation Readiness:  [ ] Not Applicable     [ ] Discussed and Assessed  Comments:    =========================CARDIOVASCULAR========================  HR: 68 (10-28-19 @ 23:00) (65 - 78)  BP: 114/56 (10-28-19 @ 23:00) (111/65 - 127/74)  ABP: --  CVP(mm Hg): --  NIRS:  Cardiac Rhythm:	[x] NSR		[ ] Other:    Patient Care Access:  Comments:    =====================HEMATOLOGY/ONCOLOGY=====================  Transfusions:	[ ] PRBC	[ ] Platelets	[ ] FFP		[ ] Cryoprecipitate  DVT Prophylaxis:  Comments:    ========================INFECTIOUS DISEASE=======================  T(C): 36.7 (10-28-19 @ 23:00), Max: 36.8 (10-28-19 @ 14:08)  T(F): 98 (10-28-19 @ 23:00), Max: 98.2 (10-28-19 @ 14:08)  [ ] Cooling [place] being used. Target Temperature:      ==================FLUIDS/ELECTROLYTES/NUTRITION=================  I&O's Summary    28 Oct 2019 07:01  -  28 Oct 2019 23:51  --------------------------------------------------------  IN: 300 mL / OUT: 0 mL / NET: 300 mL      Diet: NPO  [ ] NGT		[ ] NDT		[ ] GT		[ ] GJT    dextrose 5% + sodium chloride 0.9%. - Pediatric 1000 milliLiter(s) IV Continuous <Continuous>  Comments:    ==========================NEUROLOGY===========================  [ ] SBS:		[ ] PIPPA-1:	[ ] BIS:	[ ] CAPD:  levETIRAcetam IV Intermittent - Peds 750 milliGRAM(s) IV Intermittent every 12 hours  [x] Adequacy of sedation and pain control has been assessed and adjusted  Comments:    OTHER MEDICATIONS:  dexAMETHasone IV Intermittent - Pediatric 4 milliGRAM(s) IV Intermittent every 6 hours    =========================PATIENT CARE==========================  [ ] There are pressure ulcers/areas of breakdown that are being addressed.  [x] Preventative measures are being taken to decrease risk for skin breakdown.  [x] Necessity of urinary, arterial, and venous catheters discussed    =========================PHYSICAL EXAM=========================  GENERAL: conversational without distress  RESPIRATORY: Lungs clear to auscultation bilaterally. Good aeration. No rales, rhonchi, retractions or wheezing. Effort even and unlabored.  CARDIOVASCULAR: Regular rate and rhythm. Normal S1/S2. No murmurs, rubs, or gallop. Capillary refill < 2 seconds. Distal pulses 2+ and equal.  ABDOMEN: Soft, non-distended. Bowel sounds present. No palpable hepatosplenomegaly.  SKIN: No rash.  EXTREMITIES: Warm and well perfused. No gross extremity deformities.  NEUROLOGIC: endorses limited visual fields from left side, muslce sternght 5/5, reflexes equal bilaterally     ===============================================================  LABS:                                            12.8                  Neurophils% (auto):   73.7   (10-28 @ 16:20):    5.60 )-----------(275          Lymphocytes% (auto):  19.3                                          39.1                   Eosinphils% (auto):   1< from: CT Head No Cont     Manual%: Neutrophils x    ; Lymphocytes x    ; Eosinophils x    ; Bands%: x    ; Blasts x        ( 10-28 @ 17:45 )   PT: 12.4 SEC;   INR: 1.08   aPTT: x                                139    |  101    |  12                  Calcium: 9.5   / iCa: x      (10-28 @ 16:20)    ----------------------------<  98        Magnesium: x                                3.2     |  23     |  0.68             Phosphorous: x        TPro  7.9    /  Alb  4.9    /  TBili  0.4    /  DBili  x      /  AST  20     /  ALT  12     /  AlkPhos  80     28 Oct 2019 16:20  RECENT CULTURES:      IMAGING STUDIES:    (10.28.19 @ 15:34) >  Impression: Dilated right temporal region and occipital right lateral   ventricle. This could be compatible with intraventricular arachnoid cyst   versus a trapped ventricle. Please correlate clinically.      < end of copied text >  .1        Parent/Guardian is at the bedside:	[ ] Yes	[ ] No  Patient and Parent/Guardian updated as to the progress/plan of care:	[ ] Yes	[ ] No    [ ] The patient remains in critical and unstable condition, and requires ICU care and monitoring, total critical care time spent by myself, the attending physician was __ minutes, excluding procedure time.  [ ] The patient is improving but requires continued monitoring and adjustment of therapy

## 2019-10-28 NOTE — PATIENT PROFILE PEDIATRIC. - ALCOHOL USE HISTORY SINGLE SELECT
Pt drank 45 ml of Pedialyte  Pt did not provide a urine specimen from U-bag at this time   Pt is currently sleeping comfortably     Lisa Hall, [address] [facility]  12/22/18 [id number] never

## 2019-10-28 NOTE — ED PROVIDER NOTE - PROGRESS NOTE DETAILS
CT shows concern for ventricular obstruction. NSX consulted. Keep NPO and obtains labs. MARILIN Carver MD [facility] Attending S/p NSX eval, will admit to PICU for likely surgical repair. Will need MRI imaging. Will give Morphine for pain and per NSX start on steroids. MARILIN Carver MD [facility] Attending

## 2019-10-28 NOTE — ED PROVIDER NOTE - OBJECTIVE STATEMENT
17 y/o F pmhx ependymoma s/p GTR 8/2012 s/p endoscopic resection of intraventricular arachnoid cyst on 8/6/19 presenting for new onset headaches and decreased vision on L 19 y/o F pmhx ependymoma s/p GTR 8/2012 s/p endoscopic resection of intraventricular arachnoid cyst on 8/6/19 presenting for new onset headaches and decreased vision on L side of L eye. She states that the headaches began on Saturday and have continued to worsen. The headaches are global and associated with dizziness. She now complains of blurry vision and trouble focusing. Mother states that she has been sleeping more and overall more tired. 17 y/o F pmhx ependymoma s/p resection 8/2012 s/p endoscopic resection of intraventricular arachnoid cyst on 8/6/19 presenting for new onset headaches and decreased vision on L side of L eye. She states that the headaches began 3 days prior to presentation and have continued to worsen. The headaches are global and associated with dizziness. She now complains of blurry vision and trouble focusing. Mother states that she has been sleeping more and overall more tired. No fevers. No other symptoms. No vomiting.

## 2019-10-29 ENCOUNTER — RESULT REVIEW (OUTPATIENT)
Age: 19
End: 2019-10-29

## 2019-10-29 ENCOUNTER — TRANSCRIPTION ENCOUNTER (OUTPATIENT)
Age: 19
End: 2019-10-29

## 2019-10-29 LAB
ANION GAP SERPL CALC-SCNC: 11 MMO/L — SIGNIFICANT CHANGE UP (ref 7–14)
APTT BLD: 36 SEC — SIGNIFICANT CHANGE UP (ref 27.5–36.3)
BASE EXCESS BLDA CALC-SCNC: -3 MMOL/L — SIGNIFICANT CHANGE UP
BASOPHILS # BLD AUTO: 0.01 K/UL — SIGNIFICANT CHANGE UP (ref 0–0.2)
BASOPHILS NFR BLD AUTO: 0.1 % — SIGNIFICANT CHANGE UP (ref 0–2)
BUN SERPL-MCNC: 10 MG/DL — SIGNIFICANT CHANGE UP (ref 7–23)
CA-I BLD-SCNC: 1.16 MMOL/L — SIGNIFICANT CHANGE UP (ref 1.03–1.23)
CA-I BLDA-SCNC: 1.26 MMOL/L — SIGNIFICANT CHANGE UP (ref 1.15–1.29)
CALCIUM SERPL-MCNC: 8.9 MG/DL — SIGNIFICANT CHANGE UP (ref 8.4–10.5)
CHLORIDE SERPL-SCNC: 112 MMOL/L — HIGH (ref 98–107)
CLARITY CSF: CLEAR — SIGNIFICANT CHANGE UP
CO2 SERPL-SCNC: 21 MMOL/L — LOW (ref 22–31)
COLOR CSF: SIGNIFICANT CHANGE UP
COMMENT - SPINAL FLUID: SIGNIFICANT CHANGE UP
CREAT SERPL-MCNC: 0.61 MG/DL — SIGNIFICANT CHANGE UP (ref 0.5–1.3)
EOSINOPHIL # BLD AUTO: 0 K/UL — SIGNIFICANT CHANGE UP (ref 0–0.5)
EOSINOPHIL NFR BLD AUTO: 0 % — SIGNIFICANT CHANGE UP (ref 0–6)
GLUCOSE BLDA-MCNC: 131 MG/DL — HIGH (ref 70–99)
GLUCOSE CSF-MCNC: 87 MG/DL — HIGH (ref 40–70)
GLUCOSE SERPL-MCNC: 131 MG/DL — HIGH (ref 70–99)
GRAM STN CSF: SIGNIFICANT CHANGE UP
HCO3 BLDA-SCNC: 22 MMOL/L — SIGNIFICANT CHANGE UP (ref 22–26)
HCT VFR BLD CALC: 38 % — SIGNIFICANT CHANGE UP (ref 34.5–45)
HCT VFR BLDA CALC: 37.4 % — SIGNIFICANT CHANGE UP (ref 34.5–46.5)
HGB BLD-MCNC: 12.2 G/DL — SIGNIFICANT CHANGE UP (ref 11.5–15.5)
HGB BLDA-MCNC: 12.2 G/DL — SIGNIFICANT CHANGE UP (ref 11.5–15.5)
IMM GRANULOCYTES NFR BLD AUTO: 0.4 % — SIGNIFICANT CHANGE UP (ref 0–1.5)
LYMPHOCYTES # BLD AUTO: 0.64 K/UL — LOW (ref 1–3.3)
LYMPHOCYTES # BLD AUTO: 4.7 % — LOW (ref 13–44)
MAGNESIUM SERPL-MCNC: 1.8 MG/DL — SIGNIFICANT CHANGE UP (ref 1.6–2.6)
MCHC RBC-ENTMCNC: 31 PG — SIGNIFICANT CHANGE UP (ref 27–34)
MCHC RBC-ENTMCNC: 32.1 % — SIGNIFICANT CHANGE UP (ref 32–36)
MCV RBC AUTO: 96.4 FL — SIGNIFICANT CHANGE UP (ref 80–100)
MONOCYTES # BLD AUTO: 0.47 K/UL — SIGNIFICANT CHANGE UP (ref 0–0.9)
MONOCYTES NFR BLD AUTO: 3.4 % — SIGNIFICANT CHANGE UP (ref 2–14)
NEUTROPHILS # BLD AUTO: 12.54 K/UL — HIGH (ref 1.8–7.4)
NEUTROPHILS NFR BLD AUTO: 91.4 % — HIGH (ref 43–77)
NRBC # FLD: 0 K/UL — SIGNIFICANT CHANGE UP (ref 0–0)
NRBC NFR CSF: 1 CELL/UL — SIGNIFICANT CHANGE UP (ref 0–5)
PCO2 BLDA: 33 MMHG — SIGNIFICANT CHANGE UP (ref 32–48)
PH BLDA: 7.42 PH — SIGNIFICANT CHANGE UP (ref 7.35–7.45)
PHOSPHATE SERPL-MCNC: 2.8 MG/DL — SIGNIFICANT CHANGE UP (ref 2.5–4.5)
PLATELET # BLD AUTO: 279 K/UL — SIGNIFICANT CHANGE UP (ref 150–400)
PMV BLD: 11.4 FL — SIGNIFICANT CHANGE UP (ref 7–13)
PO2 BLDA: 387 MMHG — HIGH (ref 83–108)
POTASSIUM BLDA-SCNC: 3.8 MMOL/L — SIGNIFICANT CHANGE UP (ref 3.4–4.5)
POTASSIUM SERPL-MCNC: 3.7 MMOL/L — SIGNIFICANT CHANGE UP (ref 3.5–5.3)
POTASSIUM SERPL-SCNC: 3.7 MMOL/L — SIGNIFICANT CHANGE UP (ref 3.5–5.3)
PROT CSF-MCNC: 7.8 MG/DL — LOW (ref 15–40)
RBC # BLD: 3.94 M/UL — SIGNIFICANT CHANGE UP (ref 3.8–5.2)
RBC # CSF: 1472 CELL/UL — HIGH (ref 0–0)
RBC # FLD: 11.9 % — SIGNIFICANT CHANGE UP (ref 10.3–14.5)
SAO2 % BLDA: 100 % — HIGH (ref 95–99)
SODIUM BLDA-SCNC: 140 MMOL/L — SIGNIFICANT CHANGE UP (ref 136–146)
SODIUM SERPL-SCNC: 144 MMOL/L — SIGNIFICANT CHANGE UP (ref 135–145)
SPECIMEN SOURCE: SIGNIFICANT CHANGE UP
TOTAL CELLS COUNTED, SPINAL FLUID: 1 CELLS — SIGNIFICANT CHANGE UP
WBC # BLD: 13.72 K/UL — HIGH (ref 3.8–10.5)
WBC # FLD AUTO: 13.72 K/UL — HIGH (ref 3.8–10.5)
XANTHOCHROMIA: SIGNIFICANT CHANGE UP

## 2019-10-29 PROCEDURE — 88108 CYTOPATH CONCENTRATE TECH: CPT | Mod: 26

## 2019-10-29 PROCEDURE — 99291 CRITICAL CARE FIRST HOUR: CPT

## 2019-10-29 PROCEDURE — 70450 CT HEAD/BRAIN W/O DYE: CPT | Mod: 26,GC

## 2019-10-29 RX ORDER — DEXTROSE MONOHYDRATE, SODIUM CHLORIDE, AND POTASSIUM CHLORIDE 50; .745; 4.5 G/1000ML; G/1000ML; G/1000ML
1000 INJECTION, SOLUTION INTRAVENOUS
Refills: 0 | Status: DISCONTINUED | OUTPATIENT
Start: 2019-10-29 | End: 2019-10-30

## 2019-10-29 RX ORDER — CEFAZOLIN SODIUM 1 G
2000 VIAL (EA) INJECTION EVERY 8 HOURS
Refills: 0 | Status: DISCONTINUED | OUTPATIENT
Start: 2019-10-29 | End: 2019-10-29

## 2019-10-29 RX ORDER — CEFAZOLIN SODIUM 1 G
1000 VIAL (EA) INJECTION EVERY 8 HOURS
Refills: 0 | Status: COMPLETED | OUTPATIENT
Start: 2019-10-29 | End: 2019-10-30

## 2019-10-29 RX ORDER — MORPHINE SULFATE 50 MG/1
2 CAPSULE, EXTENDED RELEASE ORAL ONCE
Refills: 0 | Status: DISCONTINUED | OUTPATIENT
Start: 2019-10-29 | End: 2019-10-29

## 2019-10-29 RX ORDER — FAMOTIDINE 10 MG/ML
20 INJECTION INTRAVENOUS EVERY 12 HOURS
Refills: 0 | Status: DISCONTINUED | OUTPATIENT
Start: 2019-10-29 | End: 2019-10-31

## 2019-10-29 RX ORDER — ACETAMINOPHEN 500 MG
650 TABLET ORAL EVERY 6 HOURS
Refills: 0 | Status: DISCONTINUED | OUTPATIENT
Start: 2019-10-29 | End: 2019-10-31

## 2019-10-29 RX ADMIN — FAMOTIDINE 200 MILLIGRAM(S): 10 INJECTION INTRAVENOUS at 17:52

## 2019-10-29 RX ADMIN — Medication 4 MILLIGRAM(S): at 17:52

## 2019-10-29 RX ADMIN — Medication 650 MILLIGRAM(S): at 19:08

## 2019-10-29 RX ADMIN — LEVETIRACETAM 200 MILLIGRAM(S): 250 TABLET, FILM COATED ORAL at 17:38

## 2019-10-29 RX ADMIN — Medication 650 MILLIGRAM(S): at 19:48

## 2019-10-29 RX ADMIN — MORPHINE SULFATE 2 MILLIGRAM(S): 50 CAPSULE, EXTENDED RELEASE ORAL at 19:08

## 2019-10-29 RX ADMIN — FAMOTIDINE 200 MILLIGRAM(S): 10 INJECTION INTRAVENOUS at 06:59

## 2019-10-29 RX ADMIN — Medication 100 MILLIGRAM(S): at 10:07

## 2019-10-29 RX ADMIN — MORPHINE SULFATE 2 MILLIGRAM(S): 50 CAPSULE, EXTENDED RELEASE ORAL at 17:38

## 2019-10-29 RX ADMIN — Medication 4 MILLIGRAM(S): at 10:00

## 2019-10-29 RX ADMIN — Medication 4 MILLIGRAM(S): at 23:45

## 2019-10-29 RX ADMIN — Medication 100 MILLIGRAM(S): at 17:38

## 2019-10-29 RX ADMIN — Medication 4 MILLIGRAM(S): at 05:55

## 2019-10-29 RX ADMIN — Medication 3 UNIT(S)/KG/HR: at 17:40

## 2019-10-29 RX ADMIN — LEVETIRACETAM 200 MILLIGRAM(S): 250 TABLET, FILM COATED ORAL at 06:20

## 2019-10-29 NOTE — DISCHARGE NOTE PROVIDER - HOSPITAL COURSE
17 y/o F pmhx ependymoma s/p GTR 8/2012 s/p endoscopic resection of intraventricular arachnoid cyst on 8/6/19 presenting for new onset headaches and decreased vision on L side of L eye. She states that the headaches, currently 10/10 began on Saturday and have continued to worsen. The headaches are global and associated with dizziness. She now complains of blurry vision and trouble focusing. Mother states that she has been sleeping more and overall more tired.        In the ED, CT shows dilated right temporal region & occipital right lateral ventricle. Compatible with intraventricular arachnoid cyst vs trapped ventricle. MRI stereotatic performed. Received 10mg Decadron and Keppra loading dose. CBC, CMP, coags sent.        PICU Course (10/28- 19 y/o F pmhx ependymoma s/p GTR 8/2012 s/p endoscopic resection of intraventricular arachnoid cyst on 8/6/19 presenting for new onset headaches and decreased vision on L side of L eye. She states that the headaches, currently 10/10 began on Saturday and have continued to worsen. The headaches are global and associated with dizziness. She now complains of blurry vision and trouble focusing. Mother states that she has been sleeping more and overall more tired.        In the ED, CT shows dilated right temporal region & occipital right lateral ventricle. Compatible with intraventricular arachnoid cyst vs trapped ventricle. MRI stereotatic performed. Received 10mg Decadron and Keppra loading dose. CBC, CMP, coags sent.        PICU Course (10/28-        Patient was taken to OR for right craniotomy.  Tolerated procedure well, required morphine and tylenol PRN for pain.  Post operative CBC, BMP and i-adrián were sent. 17 y/o F pmhx ependymoma s/p GTR 8/2012 s/p endoscopic resection of intraventricular arachnoid cyst on 8/6/19 presenting for new onset headaches and decreased vision on L side of L eye. She states that the headaches, currently 10/10 began on Saturday and have continued to worsen. The headaches are global and associated with dizziness. She now complains of blurry vision and trouble focusing. Mother states that she has been sleeping more and overall more tired.        In the ED, CT shows dilated right temporal region & occipital right lateral ventricle. Compatible with intraventricular arachnoid cyst vs trapped ventricle. MRI stereotatic performed. Received 10mg Decadron and Keppra loading dose. CBC, CMP, coags sent.        PICU Course (10/28-10/30)        Patient was taken to OR for right craniotomy.  Tolerated procedure well, required morphine and tylenol PRN for pain.  Patient was given ancef for post operative antibiotic prophyalxis. Post operative CBC, BMP and i-adrián were sent.  Decadron was tapered. 17 y/o F pmhx ependymoma s/p GTR 8/2012 s/p endoscopic resection of intraventricular arachnoid cyst on 8/6/19 presenting for new onset headaches and decreased vision on L side of L eye. She states that the headaches, currently 10/10 began on Saturday and have continued to worsen. The headaches are global and associated with dizziness. She now complains of blurry vision and trouble focusing. Mother states that she has been sleeping more and overall more tired.        In the ED, CT shows dilated right temporal region & occipital right lateral ventricle. Compatible with intraventricular arachnoid cyst vs trapped ventricle. MRI stereotatic performed. Received 10mg Decadron and Keppra loading dose. CBC, CMP, coags sent.        PICU Course (10/28-10/30)        Patient was taken to OR for right craniotomy.  Tolerated procedure well, required morphine and tylenol PRN for pain.  Patient was given ancef for post operative antibiotic prophyalxis. Post operative CBC, BMP and i-adrián were sent.  Decadron was tapered.        MRI brain was obtained post op, shows fenestration of cyst.     Patient was transferred to the floor. She ambulated well without assistance. Tolerated pain and diet well. Stable for d/c home.

## 2019-10-29 NOTE — DISCHARGE NOTE PROVIDER - NSDCCPCAREPLAN_GEN_ALL_CORE_FT
PRINCIPAL DISCHARGE DIAGNOSIS  Diagnosis: Headache  Assessment and Plan of Treatment:       SECONDARY DISCHARGE DIAGNOSES  Diagnosis: Intraventricular cyst of brain  Assessment and Plan of Treatment:

## 2019-10-29 NOTE — DISCHARGE NOTE PROVIDER - CARE PROVIDER_API CALL
Chandler Beckham)  Neurological Surgery; Pediatric Neurological Surgery  [address], [address]  [place], [place] [id number]  Phone: [phone]  Fax: [phone]  Follow Up Time: 1 week

## 2019-10-29 NOTE — DISCHARGE NOTE PROVIDER - NSDCFUADDINST_GEN_ALL_CORE_FT
1. Remove top surgical dressing on post operative day 3 unless it was removed by the surgical team prior to your discharge. Incision should be left uncovered after day 3.   2. Begin showering with shampoo on post operative day 4. Avoid long soaks and do not submerge incision in bathtub. Regular shower only and allow soap and water to run over the incision. Pat incision area dry with clean towel- do not scrub. Please shower regularly to ensure incision stays clean to avoid post operative infections.   3. Notify your surgeon if you notice increased redness, drainage or you notice incision area opening.   4. Return to ER immediately for high fevers, severe headache, vomiting, lethargy or  weakness  5. Please call your neurosurgeon following discharge to make follow up appointment in 1 week after discharge unless otherwise specified. See Contact information below.   6. Prescription Post operative medication, if applicable, are sent to [facility] (unless another pharmacy specified)- [facility] is located in [facility]. All post operative prescriptions should be picked up before departing the hospital.  7. Ambulate as tolerate. Continue with all "activities of daily living." Avoid strenuous activity or lifting more than 10 pounds until cleared for additional activity at your follow up appointment.  8. Do not return to work or school until cleared by your neurosurgeon at your follow up visit unless specified to you during your hospital stay

## 2019-10-29 NOTE — PROGRESS NOTE PEDS - PROBLEM SELECTOR PLAN 1
1. clear diet, advance as tolerated  2. check visual fields this evening  3. void check  4. decadron taper  5. OOB as tolerated  6. pain meds PRN.

## 2019-10-29 NOTE — DISCHARGE NOTE PROVIDER - NSDCCPTREATMENT_GEN_ALL_CORE_FT
PRINCIPAL PROCEDURE  Procedure: Frontal craniotomy  Findings and Treatment:       SECONDARY PROCEDURE  Procedure: Neuroendoscopic fenestration of intraventricular cyst  Findings and Treatment:

## 2019-10-29 NOTE — PROGRESS NOTE PEDS - ASSESSMENT
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 CT revealed asymptomatically enlarged ventricles and MRI with concern for entrapped right ventricle. Neurosurgery on consult and planned for OR in am.     Resp:  Stable in room air     CV:  no issues    FENGI:  NPO    Heme:  CBC and coags for am OR    Neuro:  Decadron per neurosurgery  Keppra prophylaxis  will monitor for acute changes indicating acute rise in ICP. Low threshold to have neurosurgery evaluate frequently at bedside should there be an acute change in physical exam.     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 CT revealed asymptomatically enlarged ventricles and MRI with concern for entrapped right ventricle asymmetric obstruction of CSF). Neurosurgery on consult and planned for OR in am.     Resp:  No acute issues, on RA    CV/HEME:  HDS    FENGI:  NPO  IVF    Heme:  CBC and coags for am OR    Neuro:  Decadron- continue per Nsx  Keppra prophylaxis  will monitor for acute changes indicating acute rise in ICP. Low threshold to have neurosurgery evaluate frequently at bedside should there be an acute change in physical exam. 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 Neurosurgery on consult and planned for OR in am.     Resp:  No acute issues, on RA    CV/HEME:  HDS    FENGI:  NPO  IVF    Heme:  CBC and coags for am OR    Neuro:  Decadron- continue per Nsx  Keppra prophylaxis  will monitor for acute changes indicating acute rise in ICP. Low threshold to have neurosurgery evaluate frequently at bedside should there be an acute change in physical exam.

## 2019-10-29 NOTE — PROGRESS NOTE PEDS - SUBJECTIVE AND OBJECTIVE BOX
POC- s/p fenestration of trapped right ventricle    patient seen and examined with mother bedside, reports moderate incisional pain.  Denies any other complaints,  Post op CTH head shows Postop changes compatible with a right suboccipital/parietal craniotomy is identified. There is now evidence of a catheter approaching from the inferior right parietal region with the tip in the posterior aspect of the right temporal horn. Please correlate clinically as to its position and function. There is considerable decompression of previously noted dilated ventricle. Abnormal low-attenuation involving the right posterior temporal, posterior frontal and parietal region.  Decrease in size of the left lateral ventricle seen. Small amount of intraventricular air is identified as well as bifrontal air right greater   than left.    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 She states that the headaches, currently 10/10 began on Saturday and have continued to worsen. The headaches are global and associated with dizziness. She now complains of blurry vision and trouble focusing. Mother states that she has been sleeping more and overall more tired. 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  Chronic Serous Otitis Media of Both Ears  Migraine Headache  Congenital Hearing Loss  Brain ependymoma: s/p GTR 2012 at [facility] [place]  Status Post Myringotomy with Insertion of Tube    PHYSICAL EXAM:  AA&0 x 3, speach clear, follows simple, commands, face symmetrical, non compliant with visual acuity exam  Motor- strength 5/5 throughout  Muscle Tone- normal  Sensory - intact to light touch  Incision site- mild edema, with sero-sangenous drainage on dressing    Diet:  Regular ( x )  NPO       (  )    Drains:  ventriculostomy   (  )  Lumbar drain       (  )  OPAL drain               (  )  Hemovac              (  )    Vital Signs Last 24 Hrs  T(C): 36.8 (29 Oct 2019 07:56), Max: 36.8 (28 Oct 2019 17:05)  T(F): 98.2 (29 Oct 2019 07:15), Max: 98.2 (28 Oct 2019 17:05)  HR: 60 (29 Oct 2019 07:56) (49 - 75)  BP: 108/47 (29 Oct 2019 07:56) (104/42 - 127/74)  BP(mean): 63 (29 Oct 2019 07:15) (56 - 69)  RR: 16 (29 Oct 2019 07:56) (13 - 18)  SpO2: 98% (29 Oct 2019 07:56) (96% - 100%)  I&O's Summary    28 Oct 2019 07:01  -  29 Oct 2019 07:00  --------------------------------------------------------  IN: 900 mL / OUT: 0 mL / NET: 900 mL    29 Oct 2019 07:01  -  29 Oct 2019 15:35  --------------------------------------------------------  IN: 100 mL / OUT: 0 mL / NET: 100 mL      MEDICATIONS  (STANDING):  ceFAZolin  IV Intermittent - Peds 1000 milliGRAM(s) IV Intermittent every 8 hours  dexAMETHasone IV Intermittent - Pediatric 4 milliGRAM(s) IV Intermittent every 6 hours  famotidine IV Intermittent - Peds 20 milliGRAM(s) IV Intermittent every 12 hours  heparin   Infusion - Pediatric 0.041 Unit(s)/kG/Hr (3 mL/Hr) IV Continuous <Continuous>  levETIRAcetam IV Intermittent - Peds 750 milliGRAM(s) IV Intermittent every 12 hours  sodium chloride 0.9% with potassium chloride 20 mEq/L. - Pediatric 1000 milliLiter(s) (100 mL/Hr) IV Continuous <Continuous>    MEDICATIONS  (PRN):    LABS:                        12.8   5.60  )-----------( 275      ( 28 Oct 2019 16:20 )             39.1     10-28    139  |  101  |  12  ----------------------------<  98  3.2<L>   |  23  |  0.68    Ca    9.5      28 Oct 2019 16:20    TPro  7.9  /  Alb  4.9  /  TBili  0.4  /  DBili  x   /  AST  20  /  ALT  12  /  AlkPhos  80  10-28    PT/INR - ( 28 Oct 2019 17:45 )   PT: 12.4 SEC;   INR: 1.08          PTT - ( 29 Oct 2019 03:30 )  PTT:36.0 SEC

## 2019-10-29 NOTE — CHART NOTE - NSCHARTNOTEFT_GEN_A_CORE
Neurosurgery preop                          12.8   5.60  )-----------( 275      ( 28 Oct 2019 16:20 )             39.1     10-28    139  |  101  |  12  ----------------------------<  98  3.2<L>   |  23  |  0.68    Ca    9.5      28 Oct 2019 16:20    TPro  7.9  /  Alb  4.9  /  TBili  0.4  /  DBili  x   /  AST  20  /  ALT  12  /  AlkPhos  80  10-28    PT/INR - ( 28 Oct 2019 17:45 )   PT: 12.4 SEC;   INR: 1.08          PTT - ( 29 Oct 2019 03:30 )  PTT:36.0 SEC    Type + Screen (10.28.19 @ 15:18)    ABO Interpretation: O    Rh Interpretation: Positive    Antibody Screen: Negative    HCG Quantitative, Serum (10.28.19 @ 17:50)    HCG Quantitative, Serum: < 5.0: For pregnancy evaluation the reference values are as  follows:    Negative: <5 mIU/mL  Indeterminate: 5-25 mIU/mL (suggest repeat testing in 72hrs)  Positive: >25 mIU/mL    Note: hCG results of false positive and false negative for  pregnancy are rare, but can occur with this, and other hCG  tests. Liz- and post-menopausal females may have mildly  elevated hCG concentrations, usually less than 14 mIU/mL,  that are constant over time.    Weeks of pregnancy:           mIU/mL  ------------------        -------  3                     6 -      71  4                    10 -     750  5                   220 -   7,100  6                   160 -  32,000  7                 3,700 - 164,000  8                32,000 - 150,000  9                64,000- 151,000  10                47,000 - 187,000  12                28,000 - 211,000  14                14,000 -  63,000  15                12,000 -  71,000  16 - 18            8,000 -  58,000 mIU/mL

## 2019-10-29 NOTE — PROGRESS NOTE PEDS - SUBJECTIVE AND OBJECTIVE BOX
Interval/Overnight Events:    VITAL SIGNS:  T(C): 36.8 (10-29-19 @ 07:15), Max: 36.8 (10-28-19 @ 14:08)  HR: 60 (10-29-19 @ 07:15) (49 - 78)  BP: 108/47 (10-29-19 @ 07:15) (104/42 - 127/74)  ABP: --  ABP(mean): --  RR: 16 (10-29-19 @ 07:15) (13 - 18)  SpO2: 98% (10-29-19 @ 07:15) (96% - 100%)  CVP(mm Hg): --  End-Tidal CO2:  NIRS:  Daily Weight in Gm: 40819 (28 Oct 2019 23:00)    ==========================PHYSICAL EXAM========================  GENERAL: In no acute distress  RESPIRATORY: Lungs clear to auscultation B/L. Good aeration. No rales, rhonchi, retractions, wheezing. Effort even and unlabored.  CARDIOVASCULAR: Regular rate and rhythm. Normal S1/S2. No M,R,G. Capillary refill < 2 seconds. Distal pulses 2+ and equal.  ABDOMEN: Soft, non-distended.  No palpable HSM  SKIN: No rash.  EXTREMITIES: Warm and well perfused. No gross extremity deformities.  NEUROLOGIC: Alert and oriented. No acute change from baseline exam.      ===========================RESPIRATORY==========================  [ ] FiO2: ___ 	[ ] Heliox: ____ 		[ ] BiPAP: ___ /  [ ] CPAP:____  [ ] NC: __  Liters			[ ] HFNC: __ 	Liters, FiO2: __  [ ] Mechanical Ventilation:   [ ] Inhaled Nitric Oxide:      [ ] Extubation Readiness Assessed  Secretions:  =========================CARDIOVASCULAR========================  Cardiac Rhythm:	[x] NSR		[ ] Other:  Chest Tube:[ ] Right     [ ] Left    [ ] Mediastinal                       Output: ___ in 24 hours, ___ in last 12 hours         [ ] Central Venous Line	[ ] R	[ ] L	[ ] IJ	[ ] Fem	[ ] SC			Placed:   [ ] Arterial Line		[ ] R	[ ] L	[ ] PT	[ ] DP	[ ] Fem	[ ] Rad	[ ] Ax	Placed:   [ ] PICC:				[ ] Broviac		[ ] Mediport    ======================HEMATOLOGY/ONCOLOGY====================  Transfusions:	[ ] PRBC	[ ] Platelets	[ ] FFP		[ ] Cryoprecipitate  DVT Prophylaxis: Turning & Positioning per protocol    ===================FLUIDS/ELECTROLYTES/NUTRITION=================  I&O's Summary    28 Oct 2019 07:01  -  29 Oct 2019 07:00  --------------------------------------------------------  IN: 900 mL / OUT: 0 mL / NET: 900 mL      Diet:	[ ] Regular	[ ] Soft		[ ] Clears	[ ] NPO  .	[ ] Other:  .	[ ] NGT		[ ] NDT		[ ] GT		[ ] GJT  [ ] Urinary Catheter, Date Placed:     ============================NEUROLOGY=========================  [ ] SBS:		[ ] PIPPA-1:	[ ] BIS:	[ ] CAPD:  [ ] EVD set at: ___ , Drainage in last 24 hours: ___ ml    levETIRAcetam IV Intermittent - Peds 750 milliGRAM(s) IV Intermittent every 12 hours    [x] Adequacy of sedation and pain control has been assessed and adjusted    ==========================MEDICATIONS==========================    Medications:  dexAMETHasone IV Intermittent - Pediatric 4 milliGRAM(s) IV Intermittent every 6 hours  famotidine IV Intermittent - Peds 20 milliGRAM(s) IV Intermittent every 12 hours  sodium chloride 0.9% with potassium chloride 20 mEq/L. - Pediatric 1000 milliLiter(s) IV Continuous <Continuous>      =========================ANCILLARY TESTS========================  LABS:                                            12.8                  Neurophils% (auto):   73.7   (10-28 @ 16:20):    5.60 )-----------(275          Lymphocytes% (auto):  19.3                                          39.1                   Eosinphils% (auto):   1.1      Manual%: Neutrophils x    ; Lymphocytes x    ; Eosinophils x    ; Bands%: x    ; Blasts x                                  139    |  101    |  12                  Calcium: 9.5   / iCa: x      (10-28 @ 16:20)    ----------------------------<  98        Magnesium: x                                3.2     |  23     |  0.68             Phosphorous: x        TPro  7.9    /  Alb  4.9    /  TBili  0.4    /  DBili  x      /  AST  20     /  ALT  12     /  AlkPhos  80     28 Oct 2019 16:20  ( 10-29 @ 03:30 )   PT: x    ;   INR: x      aPTT: 36.0 SEC  RECENT CULTURES:      ===============================================================  IMAGING STUDIES:  [ ] XR   [ ] CT   [ ] MR   [ ] US  [ ] Echo    ===========================PATIENT CARE========================  [ ] Cooling [place] being used. Target Temperature:  [ ] There are pressure ulcers/areas of breakdown that are being addressed?  [x] Preventative measures are being taken to decrease risk for skin breakdown.  [x] Necessity of urinary, arterial, and venous catheters discussed  ===============================================================    Parent/Guardian is at the bedside:	[ ] Yes	[ ] No  Patient and Parent/Guardian updated as to the progress/plan of care:	[x ] Yes	[ ] No    [x ] The patient remains in critical and unstable condition, and requires ICU care and monitoring; The total critical care time spent by attending physician was  35    minutes, excluding procedure time.  [ ] The patient is improving but requires continued monitoring and adjustment of therapy Interval/Overnight Events:  no HA overnight  no acute events    VITAL SIGNS:  T(C): 36.8 (10-29-19 @ 07:15), Max: 36.8 (10-28-19 @ 14:08)  HR: 60 (10-29-19 @ 07:15) (49 - 78)  BP: 108/47 (10-29-19 @ 07:15) (104/42 - 127/74)  RR: 16 (10-29-19 @ 07:15) (13 - 18)  SpO2: 98% (10-29-19 @ 07:15) (96% - 100%)  Daily Weight in Gm: 94382 (28 Oct 2019 23:00)    ==========================PHYSICAL EXAM========================  GENERAL: In no acute distress  RESPIRATORY: Lungs clear to auscultation B/L. Good aeration. No rales, rhonchi, retractions, wheezing. Effort even and unlabored.  CARDIOVASCULAR: Regular rate and rhythm. Normal S1/S2. No M,R,G. Capillary refill < 2 seconds. Distal pulses 2+ and equal.  ABDOMEN: Soft, non-distended.  No palpable HSM  SKIN: No rash.  EXTREMITIES: Warm and well perfused. No gross extremity deformities.  NEUROLOGIC: Alert and oriented. No acute change from baseline exam.    ===========================RESPIRATORY==========================  [x ] FiO2: _RA__ 	[ ] Heliox: ____ 		[ ] BiPAP: ___ /  [ ] CPAP:____  [ ] NC: __  Liters			[ ] HFNC: __ 	Liters, FiO2: __  [ ] Mechanical Ventilation:   [ ] Inhaled Nitric Oxide:      [ ] Extubation Readiness Assessed  Secretions:  =========================CARDIOVASCULAR========================  Cardiac Rhythm:	[x] NSR		[ ] Other:  Chest Tube:[ ] Right     [ ] Left    [ ] Mediastinal                       Output: ___ in 24 hours, ___ in last 12 hours         [ ] Central Venous Line	[ ] R	[ ] L	[ ] IJ	[ ] Fem	[ ] SC			Placed:   [ ] Arterial Line		[ ] R	[ ] L	[ ] PT	[ ] DP	[ ] Fem	[ ] Rad	[ ] Ax	Placed:   [ ] PICC:				[ ] Broviac		[ ] Mediport    ======================HEMATOLOGY/ONCOLOGY====================  Transfusions:	[ ] PRBC	[ ] Platelets	[ ] FFP		[ ] Cryoprecipitate  DVT Prophylaxis: Turning & Positioning per protocol    ===================FLUIDS/ELECTROLYTES/NUTRITION=================  I&O's Summary    28 Oct 2019 07:01  -  29 Oct 2019 07:00  --------------------------------------------------------  IN: 900 mL / OUT: 0 mL / NET: 900 mL      Diet:	[ ] Regular	[ ] Soft		[ ] Clears	[x ] NPO  .	[ ] Other:  .	[ ] NGT		[ ] NDT		[ ] GT		[ ] GJT  [ ] Urinary Catheter, Date Placed:     ============================NEUROLOGY=========================  [ ] SBS:		[ ] PIPPA-1:	[ ] BIS:	[ ] CAPD:  [ ] EVD set at: ___ , Drainage in last 24 hours: ___ ml    levETIRAcetam IV Intermittent - Peds 750 milliGRAM(s) IV Intermittent every 12 hours    [x] Adequacy of sedation and pain control has been assessed and adjusted    ==========================MEDICATIONS==========================    Medications:  dexAMETHasone IV Intermittent - Pediatric 4 milliGRAM(s) IV Intermittent every 6 hours  famotidine IV Intermittent - Peds 20 milliGRAM(s) IV Intermittent every 12 hours  sodium chloride 0.9% with potassium chloride 20 mEq/L. - Pediatric 1000 milliLiter(s) IV Continuous <Continuous>      =========================ANCILLARY TESTS========================  LABS:                                            12.8                  Neurophils% (auto):   73.7   (10-28 @ 16:20):    5.60 )-----------(275          Lymphocytes% (auto):  19.3                                          39.1                   Eosinphils% (auto):   1.1      Manual%: Neutrophils x    ; Lymphocytes x    ; Eosinophils x    ; Bands%: x    ; Blasts x                                  139    |  101    |  12                  Calcium: 9.5   / iCa: x      (10-28 @ 16:20)    ----------------------------<  98        Magnesium: x                                3.2     |  23     |  0.68             Phosphorous: x        TPro  7.9    /  Alb  4.9    /  TBili  0.4    /  DBili  x      /  AST  20     /  ALT  12     /  AlkPhos  80     28 Oct 2019 16:20  ( 10-29 @ 03:30 )   PT: x    ;   INR: x      aPTT: 36.0 SEC  RECENT CULTURES:      ===============================================================  IMAGING STUDIES:  [ ] XR   [x ] CT < from: CT Head No Cont (10.28.19 @ 15:34) >  EXAM:  CT BRAIN        PROCEDURE DATE:  Oct 28 2019       INTERPRETATION:  Clinical indication: History of tumor status post   resection of arachnoid cyst. Status post fenestration.    Multiple axial sections were performed from base of skull to vertex   without contrast enhancement. Coronal and sagittal reconstructions were   performed as well.    This exam is compared prior noncontrast head CT performed on August 6, 2019.    Postop changes compatible with a right parietal craniotomy is identified.   Dilatation of the right temporal region and occipital horn of the right   lateral ventricle seen. Surrounding transependymal flow is identified.   There is abnormal calcification seen involving the right splenium and   medial right parietal cortical region.    Increase in size of the left lateral ventricle seen.    Right to left shift (1.6 cm) seen.    Evaluation of the osseous structures with the appropriate window appears   normal    The visualized paranasal sinuses mastoid and middle ear regions appear   clear.    Impression: Dilated right temporal region and occipital right lateral   ventricle. This could be compatible with intraventricular arachnoid cyst   versus a trapped ventricle. Please correlate clinically.    JOHN BLANTON M.D., ATTENDING RADIOLOGIST  This document has been electronically signed. Oct 28 2019  3:38PM          < end of copied text >    [ ] MR   [ ] US  [ ] Echo    ===========================PATIENT CARE========================  [ ] Cooling [place] being used. Target Temperature:  [ ] There are pressure ulcers/areas of breakdown that are being addressed?  [x] Preventative measures are being taken to decrease risk for skin breakdown.  [x] Necessity of urinary, arterial, and venous catheters discussed  ===============================================================    Parent/Guardian is at the bedside:	[x ] Yes	[ ] No  Patient and Parent/Guardian updated as to the progress/plan of care:	[x ] Yes	[ ] No    [x ] The patient remains in critical and unstable condition, and requires ICU care and monitoring; The total critical care time spent by attending physician was  35    minutes, excluding procedure time.  [ ] The patient is improving but requires continued monitoring and adjustment of therapy Interval/Overnight Events:  no HA overnight  no acute events    VITAL SIGNS:  T(C): 36.8 (10-29-19 @ 07:15), Max: 36.8 (10-28-19 @ 14:08)  HR: 60 (10-29-19 @ 07:15) (49 - 78)  BP: 108/47 (10-29-19 @ 07:15) (104/42 - 127/74)  RR: 16 (10-29-19 @ 07:15) (13 - 18)  SpO2: 98% (10-29-19 @ 07:15) (96% - 100%)  Daily Weight in Gm: 39776 (28 Oct 2019 23:00)    ==========================PHYSICAL EXAM========================  GENERAL: In no acute distress  RESPIRATORY: Lungs clear to auscultation B/L. Good aeration. No rales, rhonchi, retractions, wheezing. Effort even and unlabored.  CARDIOVASCULAR: Regular rate and rhythm. Normal S1/S2. No M,R,G. Capillary refill < 2 seconds. Distal pulses 2+ and equal.  ABDOMEN: Soft, non-distended.    SKIN: No rash.  EXTREMITIES: Warm and well perfused. No gross extremity deformities.  NEUROLOGIC: asleep, arousable    ===========================RESPIRATORY==========================  [x ] FiO2: _RA__ 	[ ] Heliox: ____ 		[ ] BiPAP: ___ /  [ ] CPAP:____  [ ] NC: __  Liters			[ ] HFNC: __ 	Liters, FiO2: __  [ ] Mechanical Ventilation:   [ ] Inhaled Nitric Oxide:      [ ] Extubation Readiness Assessed  Secretions:  =========================CARDIOVASCULAR========================  Cardiac Rhythm:	[x] NSR		[ ] Other:  Chest Tube:[ ] Right     [ ] Left    [ ] Mediastinal                       Output: ___ in 24 hours, ___ in last 12 hours         [ ] Central Venous Line	[ ] R	[ ] L	[ ] IJ	[ ] Fem	[ ] SC			Placed:   [ ] Arterial Line		[ ] R	[ ] L	[ ] PT	[ ] DP	[ ] Fem	[ ] Rad	[ ] Ax	Placed:   [ ] PICC:				[ ] Broviac		[ ] Mediport    ======================HEMATOLOGY/ONCOLOGY====================  Transfusions:	[ ] PRBC	[ ] Platelets	[ ] FFP		[ ] Cryoprecipitate  DVT Prophylaxis: Turning & Positioning per protocol    ===================FLUIDS/ELECTROLYTES/NUTRITION=================  I&O's Summary    28 Oct 2019 07:01  -  29 Oct 2019 07:00  --------------------------------------------------------  IN: 900 mL / OUT: 0 mL / NET: 900 mL      Diet:	[ ] Regular	[ ] Soft		[ ] Clears	[x ] NPO  .	[ ] Other:  .	[ ] NGT		[ ] NDT		[ ] GT		[ ] GJT  [ ] Urinary Catheter, Date Placed:     ============================NEUROLOGY=========================  [ ] SBS:		[ ] PIPPA-1:	[ ] BIS:	[ ] CAPD:  [ ] EVD set at: ___ , Drainage in last 24 hours: ___ ml    levETIRAcetam IV Intermittent - Peds 750 milliGRAM(s) IV Intermittent every 12 hours    [x] Adequacy of sedation and pain control has been assessed and adjusted    ==========================MEDICATIONS==========================    Medications:  dexAMETHasone IV Intermittent - Pediatric 4 milliGRAM(s) IV Intermittent every 6 hours  famotidine IV Intermittent - Peds 20 milliGRAM(s) IV Intermittent every 12 hours  sodium chloride 0.9% with potassium chloride 20 mEq/L. - Pediatric 1000 milliLiter(s) IV Continuous <Continuous>      =========================ANCILLARY TESTS========================  LABS:                                            12.8                  Neurophils% (auto):   73.7   (10-28 @ 16:20):    5.60 )-----------(275          Lymphocytes% (auto):  19.3                                          39.1                   Eosinphils% (auto):   1.1      Manual%: Neutrophils x    ; Lymphocytes x    ; Eosinophils x    ; Bands%: x    ; Blasts x                                  139    |  101    |  12                  Calcium: 9.5   / iCa: x      (10-28 @ 16:20)    ----------------------------<  98        Magnesium: x                                3.2     |  23     |  0.68             Phosphorous: x        TPro  7.9    /  Alb  4.9    /  TBili  0.4    /  DBili  x      /  AST  20     /  ALT  12     /  AlkPhos  80     28 Oct 2019 16:20  ( 10-29 @ 03:30 )   PT: x    ;   INR: x      aPTT: 36.0 SEC  RECENT CULTURES:      ===============================================================  IMAGING STUDIES:  [ ] XR   [x ] CT < from: CT Head No Cont (10.28.19 @ 15:34) >  EXAM:  CT BRAIN        PROCEDURE DATE:  Oct 28 2019       INTERPRETATION:  Clinical indication: History of tumor status post   resection of arachnoid cyst. Status post fenestration.    Multiple axial sections were performed from base of skull to vertex   without contrast enhancement. Coronal and sagittal reconstructions were   performed as well.    This exam is compared prior noncontrast head CT performed on August 6, 2019.    Postop changes compatible with a right parietal craniotomy is identified.   Dilatation of the right temporal region and occipital horn of the right   lateral ventricle seen. Surrounding transependymal flow is identified.   There is abnormal calcification seen involving the right splenium and   medial right parietal cortical region.    Increase in size of the left lateral ventricle seen.    Right to left shift (1.6 cm) seen.    Evaluation of the osseous structures with the appropriate window appears   normal    The visualized paranasal sinuses mastoid and middle ear regions appear   clear.    Impression: Dilated right temporal region and occipital right lateral   ventricle. This could be compatible with intraventricular arachnoid cyst   versus a trapped ventricle. Please correlate clinically.    JOHN BLANTON M.D., ATTENDING RADIOLOGIST  This document has been electronically signed. Oct 28 2019  3:38PM          < end of copied text >    [ ] MR   [ ] US  [ ] Echo    ===========================PATIENT CARE========================  [ ] Cooling [place] being used. Target Temperature:  [ ] There are pressure ulcers/areas of breakdown that are being addressed?  [x] Preventative measures are being taken to decrease risk for skin breakdown.  [x] Necessity of urinary, arterial, and venous catheters discussed  ===============================================================    Parent/Guardian is at the bedside:	[x ] Yes	[ ] No  Patient and Parent/Guardian updated as to the progress/plan of care:	[x ] Yes	[ ] No    [x ] The patient remains in critical and unstable condition, and requires ICU care and monitoring; The total critical care time spent by attending physician was  35    minutes, excluding procedure time.  [ ] The patient is improving but requires continued monitoring and adjustment of therapy

## 2019-10-29 NOTE — BRIEF OPERATIVE NOTE - OPERATION/FINDINGS
Right craniotomy for exploration of lateral ventricle obstruction w/ fenestration of intraventricular cyst and opening of cavum septum into contralateral ventricle; placement of R lateral ventricle ommaya

## 2019-10-30 LAB — NON-GYNECOLOGICAL CYTOLOGY STUDY: SIGNIFICANT CHANGE UP

## 2019-10-30 PROCEDURE — 70551 MRI BRAIN STEM W/O DYE: CPT | Mod: 26

## 2019-10-30 PROCEDURE — 99233 SBSQ HOSP IP/OBS HIGH 50: CPT

## 2019-10-30 RX ORDER — DEXAMETHASONE 0.5 MG/5ML
3 ELIXIR ORAL EVERY 12 HOURS
Refills: 0 | Status: COMPLETED | OUTPATIENT
Start: 2019-10-30 | End: 2019-10-31

## 2019-10-30 RX ORDER — LEVETIRACETAM 250 MG/1
750 TABLET, FILM COATED ORAL
Refills: 0 | Status: DISCONTINUED | OUTPATIENT
Start: 2019-10-30 | End: 2019-10-31

## 2019-10-30 RX ORDER — DEXAMETHASONE 0.5 MG/5ML
ELIXIR ORAL
Refills: 0 | Status: DISCONTINUED | OUTPATIENT
Start: 2019-10-30 | End: 2019-10-31

## 2019-10-30 RX ORDER — SENNA PLUS 8.6 MG/1
1 TABLET ORAL AT BEDTIME
Refills: 0 | Status: DISCONTINUED | OUTPATIENT
Start: 2019-10-30 | End: 2019-10-31

## 2019-10-30 RX ORDER — DEXAMETHASONE 0.5 MG/5ML
2 ELIXIR ORAL DAILY
Refills: 0 | Status: DISCONTINUED | OUTPATIENT
Start: 2019-10-31 | End: 2019-10-31

## 2019-10-30 RX ORDER — DEXAMETHASONE 0.5 MG/5ML
3 ELIXIR ORAL EVERY 12 HOURS
Refills: 0 | Status: DISCONTINUED | OUTPATIENT
Start: 2019-10-30 | End: 2019-10-30

## 2019-10-30 RX ORDER — DOCUSATE SODIUM 100 MG
100 CAPSULE ORAL
Refills: 0 | Status: DISCONTINUED | OUTPATIENT
Start: 2019-10-30 | End: 2019-10-31

## 2019-10-30 RX ORDER — DEXAMETHASONE 0.5 MG/5ML
1 ELIXIR ORAL DAILY
Refills: 0 | Status: CANCELLED | OUTPATIENT
Start: 2019-11-02 | End: 2019-10-31

## 2019-10-30 RX ORDER — POLYETHYLENE GLYCOL 3350 17 G/17G
17 POWDER, FOR SOLUTION ORAL DAILY
Refills: 0 | Status: DISCONTINUED | OUTPATIENT
Start: 2019-10-30 | End: 2019-10-31

## 2019-10-30 RX ADMIN — Medication 100 MILLIGRAM(S): at 22:00

## 2019-10-30 RX ADMIN — SENNA PLUS 1 TABLET(S): 8.6 TABLET ORAL at 22:00

## 2019-10-30 RX ADMIN — Medication 4 MILLIGRAM(S): at 05:30

## 2019-10-30 RX ADMIN — Medication 3 MILLIGRAM(S): at 22:00

## 2019-10-30 RX ADMIN — Medication 3 MILLIGRAM(S): at 10:44

## 2019-10-30 RX ADMIN — LEVETIRACETAM 750 MILLIGRAM(S): 250 TABLET, FILM COATED ORAL at 18:51

## 2019-10-30 RX ADMIN — Medication 650 MILLIGRAM(S): at 11:15

## 2019-10-30 RX ADMIN — Medication 100 MILLIGRAM(S): at 02:08

## 2019-10-30 RX ADMIN — LEVETIRACETAM 200 MILLIGRAM(S): 250 TABLET, FILM COATED ORAL at 06:01

## 2019-10-30 RX ADMIN — FAMOTIDINE 200 MILLIGRAM(S): 10 INJECTION INTRAVENOUS at 18:30

## 2019-10-30 RX ADMIN — Medication 650 MILLIGRAM(S): at 10:45

## 2019-10-30 RX ADMIN — FAMOTIDINE 200 MILLIGRAM(S): 10 INJECTION INTRAVENOUS at 05:52

## 2019-10-30 NOTE — PROGRESS NOTE PEDS - SUBJECTIVE AND OBJECTIVE BOX
POST ANESTHESIA EVALUATION    18y Female POSTOP DAY 1 S/P     MENTAL STATUS: Patient participation [ x ] Awake     [  ] Arousable     [  ] Sedated    AIRWAY PATENCY: [ x ] Satisfactory  [  ] Other:     Vital Signs Last 24 Hrs  T(C): 36.9 (30 Oct 2019 08:00), Max: 37.2 (30 Oct 2019 02:00)  T(F): 98.4 (30 Oct 2019 08:00), Max: 98.9 (30 Oct 2019 02:00)  HR: 69 (30 Oct 2019 08:00) (48 - 83)  BP: 108/47 (30 Oct 2019 08:00) (98/53 - 115/61)  BP(mean): 61 (30 Oct 2019 08:00) (56 - 76)  RR: 21 (30 Oct 2019 08:00) (12 - 29)  SpO2: 99% (30 Oct 2019 08:00) (95% - 100%)  I&O's Summary    29 Oct 2019 07:01  -  30 Oct 2019 07:00  --------------------------------------------------------  IN: 1659 mL / OUT: 1515 mL / NET: 144 mL          NAUSEA/ VOMITTING:  [ x ] NONE  [  ] CONTROLLED [  ] OTHER     PAIN: [x  ] CONTROLLED WITH CURRENT REGIMEN  [  ] OTHER    [ x ] NO APPARENT ANESTHESIA COMPLICATIONS      Comments:

## 2019-10-30 NOTE — PHYSICAL THERAPY INITIAL EVALUATION PEDIATRIC - GENERAL OBSERVATIONS, REHAB EVAL
Received pt semisupine in bed, in NAD, R head dressing, tele/pulse ox, pt cleared for PT eval as per RN.

## 2019-10-30 NOTE — PROGRESS NOTE PEDS - SUBJECTIVE AND OBJECTIVE BOX
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 She is now s/p fenestration of cyst and placement of omaya reservoir on 10/29.     She was transferred from PICU today. She is currently doing well. Mild pain at surgical site. She continues to have decrease visual fields on L lateral aspect but improved from prior. She is eating and drinking well with appropriate voids/stools.     [x ] History per: chart, patient, family  [ ]  utilized, number:     [ ] Family Centered Rounds Completed.     MEDICATIONS  (STANDING):  dexAMETHasone IV Intermittent - Pediatric 3 milliGRAM(s) IV Intermittent every 12 hours  famotidine IV Intermittent - Peds 20 milliGRAM(s) IV Intermittent every 12 hours  levETIRAcetam IV Intermittent - Peds 750 milliGRAM(s) IV Intermittent every 12 hours    MEDICATIONS  (PRN):  acetaminophen   Oral Tab/Cap - Peds. 650 milliGRAM(s) Oral every 6 hours PRN Mild Pain (1 - 3), Moderate Pain (4 - 6)    Allergies    dust (Rhinorrhea; Rhinitis)  No Known Drug Allergies    Intolerances      Diet: Regular    [x ] There are no updates to the medical, surgical, social or family history unless described:    PATIENT CARE ACCESS DEVICES  [x ] Peripheral IV  [ ] Central Venous Line, Date Placed:		Site/Device:  [ ] PICC, Date Placed:  [ ] Urinary Catheter, Date Placed:  [ ] Necessity of urinary, arterial, and venous catheters discussed    Review of Systems: If not negative (Neg) please elaborate. History Per:   General: [ ] Neg  Pulmonary: [ ] Neg  Cardiac: [ ] Neg  Gastrointestinal: [x ] Neg  Ears, Nose, Throat: [ ] Neg  Renal/Urologic: [ ] Neg  Musculoskeletal: [ ] Neg  Endocrine: [ ] Neg  Hematologic: [ ] Neg  Neurologic: [x ] HA, decrease vision  Allergy/Immunologic: [ ] Neg  All other systems reviewed and negative [x ]     Vital Signs Last 24 Hrs  T(C): 36.9 (30 Oct 2019 11:49), Max: 37.2 (30 Oct 2019 02:00)  T(F): 98.4 (30 Oct 2019 11:49), Max: 98.9 (30 Oct 2019 02:00)  HR: 66 (30 Oct 2019 11:49) (55 - 83)  BP: 115/58 (30 Oct 2019 11:49) (98/53 - 115/61)  BP(mean): 66 (30 Oct 2019 11:49) (56 - 76)  RR: 21 (30 Oct 2019 11:49) (14 - 29)  SpO2: 99% (30 Oct 2019 11:49) (96% - 100%)  I&O's Summary    29 Oct 2019 07:01  -  30 Oct 2019 07:00  --------------------------------------------------------  IN: 1659 mL / OUT: 1515 mL / NET: 144 mL      Pain Score:  Daily Weight Gm: 57772 (30 Oct 2019 11:49)  BMI (kg/m2): 48.1 (10-30 @ 11:49)    Gen: no apparent distress, appears comfortable  HEENT: surgical site on R scalp covered with gauze with no visible drainage. staples to L forehead by hairline c/d/i. moist mucous membranes, PERRL extraocular movements intact, clear conjunctiva, has decrease peripheral vision on L side. R normal peripheral vision. Denies double or blurry vision.   Neck: supple  Heart: S1S2+, regular rate and rhythm, no murmur, cap refill < 2 sec, 2+ peripheral pulses  Lungs: normal respiratory pattern, clear to auscultation bilaterally  Abd: soft, nontender, nondistended, bowel sounds present, no hepatosplenomegaly  : deferred  Neuro: awake, alert, no acute change from baseline exam aside from what's above  Skin: WWP    Interval Lab Results:                        12.2   13.72 )-----------( 279      ( 29 Oct 2019 16:00 )             38.0                         12.8   5.60  )-----------( 275      ( 28 Oct 2019 16:20 )             39.1                               144    |  112    |  10                  Calcium: 8.9   / iCa: 1.16   (10-29 @ 16:00)    ----------------------------<  131       Magnesium: 1.8                              3.7     |  21     |  0.61             Phosphorous: 2.8            INTERVAL IMAGING STUDIES:

## 2019-10-30 NOTE — PHYSICAL THERAPY INITIAL EVALUATION PEDIATRIC - GROWTH AND DEVELOPMENT COMMENT, PEDS PROFILE
Pt was supposed to start [facility] in Aug however had to postpone secondary to hospitalization.    Pt is a community independent ambulator, lives in a house with her mother, no stairs.  Pt has bilateral hearing loss, wears b/l hearing aids.

## 2019-10-30 NOTE — PROGRESS NOTE PEDS - SUBJECTIVE AND OBJECTIVE BOX
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 She states that the headaches, currently 10/10 began on Saturday and have continued to worsen. The headaches are global and associated with dizziness. She now complains of blurry vision and trouble focusing. Mother states that she has been sleeping more and overall more tired. (28 Oct 2019 17:17)      OVERNIGHT EVENTS:  Doing well overnight     Vital Signs Last 24 Hrs  T(C): 37 (30 Oct 2019 05:00), Max: 37.2 (30 Oct 2019 02:00)  T(F): 98.6 (30 Oct 2019 05:00), Max: 98.9 (30 Oct 2019 02:00)  HR: 55 (30 Oct 2019 05:00) (48 - 83)  BP: 101/45 (30 Oct 2019 05:00) (98/53 - 115/61)  BP(mean): 56 (30 Oct 2019 05:00) (56 - 76)  RR: 14 (30 Oct 2019 05:00) (12 - 29)  SpO2: 98% (30 Oct 2019 05:00) (95% - 100%)    I&O's Summary    29 Oct 2019 07:01  -  30 Oct 2019 07:00  --------------------------------------------------------  IN: 1659 mL / OUT: 1515 mL / NET: 144 mL        PHYSICAL EXAM:  Mental Status: Awake, Alert, Affect appropriate  PERRL, EOMI, improving L hemianopsia per patient  Motor:  MAEx4 w/ good strength  No drift  Incision/Wound: c/d/i    TUBES/LINES:    DIET:    [ ] Regular    LABS:                        12.2   13.72 )-----------( 279      ( 29 Oct 2019 16:00 )             38.0     10-29    144  |  112<H>  |  10  ----------------------------<  131<H>  3.7   |  21<L>  |  0.61    Ca    8.9      29 Oct 2019 16:00  Phos  2.8     10-29  Mg     1.8     10-29    TPro  7.9  /  Alb  4.9  /  TBili  0.4  /  DBili  x   /  AST  20  /  ALT  12  /  AlkPhos  80  10-28    PT/INR - ( 28 Oct 2019 17:45 )   PT: 12.4 SEC;   INR: 1.08          PTT - ( 29 Oct 2019 03:30 )  PTT:36.0 SEC      CULTURES:      CSF Analysis:   Glucose, CSF: 87 mg/dL <H> (10-29 @ 13:30)  Protein, CSF: 7.8 mg/dL <L> (10-29 @ 13:30)  Culture - CSF with Gram Stain (10.29.19 @ 18:09)    Gram Stain Spinal Fluid:   WBC^White Blood Cells  QNTY CELLS IN GRAM STAIN: NO CELLS SEEN  NOS^No Organisms Seen    Specimen Source: CEREBRAL SPINAL FLUID          Allergies    dust (Rhinorrhea; Rhinitis)  No Known Drug Allergies        MEDICATIONS:  Antibiotics:    Neuro:  acetaminophen   Oral Tab/Cap - Peds. 650 milliGRAM(s) Oral every 6 hours PRN  levETIRAcetam IV Intermittent - Peds 750 milliGRAM(s) IV Intermittent every 12 hours    Anticoagulation  heparin   Infusion - Pediatric 0.041 Unit(s)/kG/Hr IV Continuous <Continuous>    OTHER:  dexAMETHasone IV Intermittent - Pediatric 4 milliGRAM(s) IV Intermittent every 6 hours  famotidine IV Intermittent - Peds 20 milliGRAM(s) IV Intermittent every 12 hours    IVF:        RADIOLOGY & ADDITIONAL TESTS:

## 2019-10-30 NOTE — PHYSICAL THERAPY INITIAL EVALUATION PEDIATRIC - PERTINENT HX OF CURRENT PROBLEM, REHAB EVAL
Pt is a 17yo female adm 10/28/19 to [facility] [place] c new onset HA, decreased peripheral vision L eye, dizziness.  Pt has a h/o ependymoma, s/p GTR 8/2012, h/o endoscopic rsxn of intraventricular arachnoid cyst 8/6/2019 Pt is a 17yo female [place] [facility] [place], decreased peripheral vision L eye, dizziness.  Pt has a h/o ependymoma, s/p GTR 8/2012, h/o endoscopic rsxn of intraventricular arachnoid cyst 8/6/2019. CT: dilated R temporal region & occipital R lat ventricle.  10/29/19 pt s/p R crani for fenestration of arachnoid cyst

## 2019-10-30 NOTE — PROGRESS NOTE PEDS - ASSESSMENT
18 year old female with history of ependymoma in 2012, recent fenstration of and intraventricular arachnoid cyst in 2019, who presented with severe headache and L periperhal vision loss. CT revealed asymmetrically enlarged ventricles and MRI with concern for entrapped right ventricle asymmetric obstruction of CSF). She is POD #1 for fenestration of cyst and placement of omaya reservoir.     1. s/p fenestration of cyst and placement of reservoir  -NSG primary  -Decadron taper  -Keppra prophylaxis  -Tylenol as needed for pain  -Repeat MRI today    OBDULIO:  -Pepcid while on decadron  -Regular diet    Brooklyn Garnett MD  Pediatric Hospitalist

## 2019-10-30 NOTE — PROGRESS NOTE PEDS - ASSESSMENT
18y female s/p R crani for fenestration of arachnoid cyst  -OOB  -Pain control   -Decrease decadron to 3q6 today  -Rpt imaging possible later

## 2019-10-30 NOTE — PROGRESS NOTE PEDS - SUBJECTIVE AND OBJECTIVE BOX
Interval/Overnight Events:    VITAL SIGNS:  T(C): 37 (10-30-19 @ 05:00), Max: 37.2 (10-30-19 @ 02:00)  HR: 55 (10-30-19 @ 05:00) (48 - 83)  BP: 101/45 (10-30-19 @ 05:00) (98/53 - 115/61)  ABP: 116/54 (10-30-19 @ 02:00) (116/54 - 149/74)  ABP(mean): 74 (10-30-19 @ 02:00) (0 - 102)  RR: 14 (10-30-19 @ 05:00) (12 - 29)  SpO2: 98% (10-30-19 @ 05:00) (95% - 100%)  CVP(mm Hg): --  End-Tidal CO2:  NIRS:  Daily Weight Gm: 19422 (29 Oct 2019 07:56)    ==========================PHYSICAL EXAM========================  GENERAL: In no acute distress  RESPIRATORY: Lungs clear to auscultation B/L. Good aeration. No rales, rhonchi, retractions, wheezing. Effort even and unlabored.  CARDIOVASCULAR: Regular rate and rhythm. Normal S1/S2. No M,R,G. Capillary refill < 2 seconds. Distal pulses 2+ and equal.  ABDOMEN: Soft, non-distended.  No palpable HSM  SKIN: No rash.  EXTREMITIES: Warm and well perfused. No gross extremity deformities.  NEUROLOGIC: Alert and oriented. No acute change from baseline exam.      ===========================RESPIRATORY==========================  [ ] FiO2: ___ 	[ ] Heliox: ____ 		[ ] BiPAP: ___ /  [ ] CPAP:____  [ ] NC: __  Liters			[ ] HFNC: __ 	Liters, FiO2: __  [ ] Mechanical Ventilation:   [ ] Inhaled Nitric Oxide:      [ ] Extubation Readiness Assessed  Secretions:  =========================CARDIOVASCULAR========================  Cardiac Rhythm:	[x] NSR		[ ] Other:  Chest Tube:[ ] Right     [ ] Left    [ ] Mediastinal                       Output: ___ in 24 hours, ___ in last 12 hours         [ ] Central Venous Line	[ ] R	[ ] L	[ ] IJ	[ ] Fem	[ ] SC			Placed:   [ ] Arterial Line		[ ] R	[ ] L	[ ] PT	[ ] DP	[ ] Fem	[ ] Rad	[ ] Ax	Placed:   [ ] PICC:				[ ] Broviac		[ ] Mediport    ======================HEMATOLOGY/ONCOLOGY====================  Transfusions:	[ ] PRBC	[ ] Platelets	[ ] FFP		[ ] Cryoprecipitate  DVT Prophylaxis: Turning & Positioning per protocol    ===================FLUIDS/ELECTROLYTES/NUTRITION=================  I&O's Summary    29 Oct 2019 07:01  -  30 Oct 2019 07:00  --------------------------------------------------------  IN: 1659 mL / OUT: 1515 mL / NET: 144 mL      Diet:	[ ] Regular	[ ] Soft		[ ] Clears	[ ] NPO  .	[ ] Other:  .	[ ] NGT		[ ] NDT		[ ] GT		[ ] GJT  [ ] Urinary Catheter, Date Placed:     ============================NEUROLOGY=========================  [ ] SBS:		[ ] PIPPA-1:	[ ] BIS:	[ ] CAPD:  [ ] EVD set at: ___ , Drainage in last 24 hours: ___ ml    acetaminophen   Oral Tab/Cap - Peds. 650 milliGRAM(s) Oral every 6 hours PRN  levETIRAcetam IV Intermittent - Peds 750 milliGRAM(s) IV Intermittent every 12 hours    [x] Adequacy of sedation and pain control has been assessed and adjusted    ==========================MEDICATIONS==========================    Medications:  heparin   Infusion - Pediatric 0.041 Unit(s)/kG/Hr IV Continuous <Continuous>  dexAMETHasone IV Intermittent - Pediatric 4 milliGRAM(s) IV Intermittent every 6 hours  famotidine IV Intermittent - Peds 20 milliGRAM(s) IV Intermittent every 12 hours      =========================ANCILLARY TESTS========================  LABS:  ABG - ( 29 Oct 2019 10:27 )  pH: 7.42  /  pCO2: 33    /  pO2: 387   / HCO3: 22    / Base Excess: -3.0  /  SaO2: 100   / Lactate: x                                                12.2                  Neurophils% (auto):   91.4   (10-29 @ 16:00):    13.72)-----------(279          Lymphocytes% (auto):  4.7                                           38.0                   Eosinphils% (auto):   0.0      Manual%: Neutrophils x    ; Lymphocytes x    ; Eosinophils x    ; Bands%: x    ; Blasts x                                  144    |  112    |  10                  Calcium: 8.9   / iCa: 1.16   (10-29 @ 16:00)    ----------------------------<  131       Magnesium: 1.8                              3.7     |  21     |  0.61             Phosphorous: 2.8      RECENT CULTURES:  10-29 @ 18:09 CEREBRAL SPINAL FLUID             ===============================================================  IMAGING STUDIES:  [ ] XR   [ ] CT   [ ] MR   [ ] US  [ ] Echo    ===========================PATIENT CARE========================  [ ] Cooling [place] being used. Target Temperature:  [ ] There are pressure ulcers/areas of breakdown that are being addressed?  [x] Preventative measures are being taken to decrease risk for skin breakdown.  [x] Necessity of urinary, arterial, and venous catheters discussed  ===============================================================    Parent/Guardian is at the bedside:	[ ] Yes	[ ] No  Patient and Parent/Guardian updated as to the progress/plan of care:	[x ] Yes	[ ] No    [x ] The patient remains in critical and unstable condition, and requires ICU care and monitoring; The total critical care time spent by attending physician was  35    minutes, excluding procedure time.  [ ] The patient is improving but requires continued monitoring and adjustment of therapy Interval/Overnight Events:  OR with Nsx yesterday  morphine x 1 for pain overnight      VITAL SIGNS:  T(C): 37 (10-30-19 @ 05:00), Max: 37.2 (10-30-19 @ 02:00)  HR: 55 (10-30-19 @ 05:00) (48 - 83)  BP: 101/45 (10-30-19 @ 05:00) (98/53 - 115/61)  ABP: 116/54 (10-30-19 @ 02:00) (116/54 - 149/74)  ABP(mean): 74 (10-30-19 @ 02:00) (0 - 102)  RR: 14 (10-30-19 @ 05:00) (12 - 29)  SpO2: 98% (10-30-19 @ 05:00) (95% - 100%)  Daily Weight Gm: 52727 (29 Oct 2019 07:56)    ==========================PHYSICAL EXAM========================  GENERAL: In no acute distress  RESPIRATORY: Lungs clear to auscultation B/L. Good aeration. No rales, rhonchi, retractions, wheezing. Effort even and unlabored.  CARDIOVASCULAR: Regular rate and rhythm. Normal S1/S2. Capillary refill < 2 seconds. Distal pulses 2+ and equal.  ABDOMEN: Soft, non-distended.    SKIN: No rash.  EXTREMITIES: Warm and well perfused. No gross extremity deformities.  NEUROLOGIC: asleep, arousable  ===========================RESPIRATORY==========================  [x ] FiO2: _RA__ 	[ ] Heliox: ____ 		[ ] BiPAP: ___ /  [ ] CPAP:____  [ ] NC: __  Liters			[ ] HFNC: __ 	Liters, FiO2: __  [ ] Mechanical Ventilation:   [ ] Inhaled Nitric Oxide:      [ ] Extubation Readiness Assessed  Secretions:  =========================CARDIOVASCULAR========================  Cardiac Rhythm:	[x] NSR		[ ] Other:  Chest Tube:[ ] Right     [ ] Left    [ ] Mediastinal                       Output: ___ in 24 hours, ___ in last 12 hours         [ ] Central Venous Line	[ ] R	[ ] L	[ ] IJ	[ ] Fem	[ ] SC			Placed:   [ ] Arterial Line		[ ] R	[ ] L	[ ] PT	[ ] DP	[ ] Fem	[ ] Rad	[ ] Ax	Placed:   [ ] PICC:				[ ] Broviac		[ ] Mediport    ======================HEMATOLOGY/ONCOLOGY====================  Transfusions:	[ ] PRBC	[ ] Platelets	[ ] FFP		[ ] Cryoprecipitate  DVT Prophylaxis: Turning & Positioning per protocol    ===================FLUIDS/ELECTROLYTES/NUTRITION=================  I&O's Summary    29 Oct 2019 07:01  -  30 Oct 2019 07:00  --------------------------------------------------------  IN: 1659 mL / OUT: 1515 mL / NET: 144 mL      Diet:	[ ] Regular	[ ] Soft		[ ] Clears	[ ] NPO  .	[ ] Other:  .	[ ] NGT		[ ] NDT		[ ] GT		[ ] GJT  [ ] Urinary Catheter, Date Placed:     ============================NEUROLOGY=========================  [ ] SBS:		[ ] PIPPA-1:	[ ] BIS:	[ ] CAPD:  [ ] EVD set at: ___ , Drainage in last 24 hours: ___ ml    acetaminophen   Oral Tab/Cap - Peds. 650 milliGRAM(s) Oral every 6 hours PRN  levETIRAcetam IV Intermittent - Peds 750 milliGRAM(s) IV Intermittent every 12 hours    [x] Adequacy of sedation and pain control has been assessed and adjusted    ==========================MEDICATIONS==========================    Medications:  heparin   Infusion - Pediatric 0.041 Unit(s)/kG/Hr IV Continuous <Continuous>  dexAMETHasone IV Intermittent - Pediatric 4 milliGRAM(s) IV Intermittent every 6 hours  famotidine IV Intermittent - Peds 20 milliGRAM(s) IV Intermittent every 12 hours      =========================ANCILLARY TESTS========================  LABS:  ABG - ( 29 Oct 2019 10:27 )  pH: 7.42  /  pCO2: 33    /  pO2: 387   / HCO3: 22    / Base Excess: -3.0  /  SaO2: 100   / Lactate: x                                                12.2                  Neurophils% (auto):   91.4   (10-29 @ 16:00):    13.72)-----------(279          Lymphocytes% (auto):  4.7                                           38.0                   Eosinphils% (auto):   0.0      Manual%: Neutrophils x    ; Lymphocytes x    ; Eosinophils x    ; Bands%: x    ; Blasts x                                  144    |  112    |  10                  Calcium: 8.9   / iCa: 1.16   (10-29 @ 16:00)    ----------------------------<  131       Magnesium: 1.8                              3.7     |  21     |  0.61             Phosphorous: 2.8      RECENT CULTURES:  10-29 @ 18:09 CEREBRAL SPINAL FLUID             ===============================================================  IMAGING STUDIES:  [ ] XR   [ ] CT   [ ] MR   [ ] US  [ ] Echo    ===========================PATIENT CARE========================  [ ] Cooling [place] being used. Target Temperature:  [ ] There are pressure ulcers/areas of breakdown that are being addressed?  [x] Preventative measures are being taken to decrease risk for skin breakdown.  [x] Necessity of urinary, arterial, and venous catheters discussed  ===============================================================    Parent/Guardian is at the bedside:	[ ] Yes	[ ] No  Patient and Parent/Guardian updated as to the progress/plan of care:	[x ] Yes	[ ] No    [x ] The patient remains in critical and unstable condition, and requires ICU care and monitoring; The total critical care time spent by attending physician was  35    minutes, excluding procedure time.  [ ] The patient is improving but requires continued monitoring and adjustment of therapy

## 2019-10-30 NOTE — PROGRESS NOTE PEDS - ASSESSMENT
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 Neurosurgery on consult and planned for OR in am.     Resp:  No acute issues, on RA    CV/HEME:  HDS    FENGI:  NPO  IVF    Heme:  CBC and coags for am OR    Neuro:  Decadron- continue per Nsx  Keppra prophylaxis  will monitor for acute changes indicating acute rise in ICP. Low threshold to have neurosurgery evaluate frequently at bedside should there be an acute change in physical exam. 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 S/P OR 10/29 for fenestration of cyst and placement of omaya reservoir    Resp:  No acute issues, on RA    CV/HEME:  HDS    FENGI:  pepcid  advance towards regular diet    Heme:  HDS  s/p gretel    Neuro:  Decadron- taper per Nsx  Keppra prophylaxis  will monitor for acute changes indicating acute rise in ICP. Low threshold to have neurosurgery evaluate frequently at bedside should there be an acute change in physical exam.

## 2019-10-31 ENCOUNTER — TRANSCRIPTION ENCOUNTER (OUTPATIENT)
Age: 19
End: 2019-10-31

## 2019-10-31 VITALS
OXYGEN SATURATION: 98 % | RESPIRATION RATE: 18 BRPM | TEMPERATURE: 98 F | HEART RATE: 71 BPM | DIASTOLIC BLOOD PRESSURE: 67 MMHG | SYSTOLIC BLOOD PRESSURE: 111 MMHG

## 2019-10-31 PROCEDURE — 99232 SBSQ HOSP IP/OBS MODERATE 35: CPT

## 2019-10-31 RX ORDER — ACETAMINOPHEN 500 MG
2 TABLET ORAL
Qty: 0 | Refills: 0 | DISCHARGE
Start: 2019-10-31

## 2019-10-31 RX ORDER — DEXAMETHASONE 0.5 MG/5ML
1 ELIXIR ORAL
Qty: 3 | Refills: 0
Start: 2019-10-31

## 2019-10-31 RX ORDER — LEVETIRACETAM 250 MG/1
1 TABLET, FILM COATED ORAL
Qty: 14 | Refills: 0
Start: 2019-10-31 | End: 2019-11-06

## 2019-10-31 RX ORDER — POLYETHYLENE GLYCOL 3350 17 G/17G
17 POWDER, FOR SOLUTION ORAL
Qty: 0 | Refills: 0 | DISCHARGE
Start: 2019-10-31

## 2019-10-31 RX ORDER — DOCUSATE SODIUM 100 MG
1 CAPSULE ORAL
Qty: 0 | Refills: 0 | DISCHARGE
Start: 2019-10-31

## 2019-10-31 RX ADMIN — FAMOTIDINE 200 MILLIGRAM(S): 10 INJECTION INTRAVENOUS at 06:15

## 2019-10-31 RX ADMIN — Medication 100 MILLIGRAM(S): at 10:11

## 2019-10-31 RX ADMIN — Medication 3 MILLIGRAM(S): at 10:11

## 2019-10-31 RX ADMIN — LEVETIRACETAM 750 MILLIGRAM(S): 250 TABLET, FILM COATED ORAL at 06:14

## 2019-10-31 NOTE — PROGRESS NOTE PEDS - REASON FOR ADMISSION
Hydrocephalus

## 2019-10-31 NOTE — DISCHARGE NOTE NURSING/CASE MANAGEMENT/SOCIAL WORK - PATIENT PORTAL LINK FT
You can access the FollowMyHealth Patient Portal offered by [facility] by registering at the following website: [email or web address]. By joining [employer]’s FollowMyHealth portal, you will also be able to view your health information using other applications (apps) compatible with our system.

## 2019-10-31 NOTE — PROGRESS NOTE PEDS - ASSESSMENT
A/P:   18y Female with h/o ependymoma s/p gross total resection in 2012, who presented with headache pain, dizziness, and worsening decreased vision of lateral field of left eye, MRI head with findings consistent with hydrocephalus with uncal herniation, s/p right craniotomy with fenestration of intraventricular cyst, opening of cavum septum and placement of R lateral ventricle ommaya, POD #2. Pt currently doing well with improved sxs.      #s/p R craniotomy with fenestration, POD#2  - Planned discharge home today, as per Neurosurgery  - Out of bed with increased mobilization across hallway advised, prior to discharge home  - Continue on decadron with tapering  - Pain management as needed - pt has not required pain meds since AM of 10/30  - Keppra 750mg PO BID for sz ppx  - Continue on bowel regimen  - Follow up outpatient with Neurosurgery and Pediatrician post-discharge

## 2019-10-31 NOTE — PROGRESS NOTE PEDS - SUBJECTIVE AND OBJECTIVE BOX
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 She states that the headaches, currently 10/10 began on Saturday and have continued to worsen. The headaches are global and associated with dizziness. She now complains of blurry vision and trouble focusing. Mother states that she has been sleeping more and overall more tired. (28 Oct 2019 17:17)      OVERNIGHT EVENTS:  Doing well, OOB ambulating well overnight.     Vital Signs Last 24 Hrs  T(C): 36.8 (31 Oct 2019 05:25), Max: 37.2 (30 Oct 2019 17:16)  T(F): 98.2 (31 Oct 2019 05:25), Max: 98.9 (30 Oct 2019 17:16)  HR: 92 (31 Oct 2019 05:25) (63 - 92)  BP: 112/72 (31 Oct 2019 05:25) (102/62 - 116/73)  BP(mean): 80 (30 Oct 2019 17:16) (66 - 80)  RR: 20 (31 Oct 2019 05:25) (18 - 23)  SpO2: 96% (31 Oct 2019 05:25) (96% - 99%)    I&O's Summary    30 Oct 2019 07:01  -  31 Oct 2019 07:00  --------------------------------------------------------  IN: 420 mL / OUT: 940 mL / NET: -520 mL        PHYSICAL EXAM:  Mental Status: Awake, Alert, Affect appropriate  PERRL, EOMI  Motor:  MAEx4 w/ good strength  No drift      Incision/Wound: c/d/i        DIET:  [ ] Regular    LABS:                        12.2   13.72 )-----------( 279      ( 29 Oct 2019 16:00 )             38.0     10-29    144  |  112<H>  |  10  ----------------------------<  131<H>  3.7   |  21<L>  |  0.61    Ca    8.9      29 Oct 2019 16:00  Phos  2.8     10-29  Mg     1.8     10-29            CULTURES:      CSF Analysis:   Glucose, CSF: 87 mg/dL <H> (10-29 @ 13:30)  Protein, CSF: 7.8 mg/dL <L> (10-29 @ 13:30)  Culture - CSF with Gram Stain (10.29.19 @ 18:09)    Gram Stain Spinal Fluid:   WBC^White Blood Cells  QNTY CELLS IN GRAM STAIN: NO CELLS SEEN  NOS^No Organisms Seen    Culture - CSF:   NO GROWTH - PRELIMINARY RESULTS    Specimen Source: CEREBRAL SPINAL FLUID        Drug Levels:       Allergies    dust (Rhinorrhea; Rhinitis)  No Known Drug Allergies        MEDICATIONS:  Antibiotics:    Neuro:  acetaminophen   Oral Tab/Cap - Peds. 650 milliGRAM(s) Oral every 6 hours PRN  levETIRAcetam  Oral Tab/Cap - Peds 750 milliGRAM(s) Oral two times a day    Anticoagulation    OTHER:  dexAMETHasone  Oral Tab/Cap - Peds   Oral   dexAMETHasone  Oral Tab/Cap - Peds 3 milliGRAM(s) Oral every 12 hours  dexAMETHasone  Oral Tab/Cap - Peds 2 milliGRAM(s) Oral daily  docusate sodium Oral Tab/Cap - Peds 100 milliGRAM(s) Oral two times a day  famotidine IV Intermittent - Peds 20 milliGRAM(s) IV Intermittent every 12 hours  polyethylene glycol 3350 Oral Powder - Peds 17 Gram(s) Oral daily PRN  senna 15 milliGRAM(s) Oral Chewable Tablet - Peds 1 Tablet(s) Chew at bedtime    IVF:      RADIOLOGY & ADDITIONAL TESTS:  < from: MR Head No Cont (10.30.19 @ 15:51) >    Impression: Postop changes as described above.    < end of copied text >

## 2019-10-31 NOTE — PROGRESS NOTE PEDS - SUBJECTIVE AND OBJECTIVE BOX
INTERVAL/OVERNIGHT EVENTS:     This is a 18y Female with h/o ependymoma s/p gross total resection in 2012, who presented with headache pain, dizziness, and worsening decreased vision of lateral field of left eye, MRI head with findings consistent with hydrocephalus, s/p right craniotomy with fenestration of intraventricular cyst, opening of cavum septum and placement of R lateral ventricle ommaya, POD #2    Pt seen and examined at bedside this AM with hospitalist attending. Pt reports that she feels better following procedure, reports improvement in her vision of the left eye, has been able to get out of bed to the bathroom, has not yet walked around in the hallway. She is well-tolerating PO intake, with normal voiding.  No reported acute overnight events.     [x ] History per: chart review and patient    [ X] Family Centered Rounds Completed.     MEDICATIONS  (STANDING):  dexAMETHasone  Oral Tab/Cap - Peds   Oral   dexAMETHasone  Oral Tab/Cap - Peds 2 milliGRAM(s) Oral daily  docusate sodium Oral Tab/Cap - Peds 100 milliGRAM(s) Oral two times a day  famotidine IV Intermittent - Peds 20 milliGRAM(s) IV Intermittent every 12 hours  levETIRAcetam  Oral Tab/Cap - Peds 750 milliGRAM(s) Oral two times a day  senna 15 milliGRAM(s) Oral Chewable Tablet - Peds 1 Tablet(s) Chew at bedtime    MEDICATIONS  (PRN):  acetaminophen   Oral Tab/Cap - Peds. 650 milliGRAM(s) Oral every 6 hours PRN Mild Pain (1 - 3), Moderate Pain (4 - 6)  polyethylene glycol 3350 Oral Powder - Peds 17 Gram(s) Oral daily PRN Constipation    Allergies    dust (Rhinorrhea; Rhinitis)  No Known Drug Allergies    Intolerances      Diet: Regular pediatric diet    [x ] There are no updates to the medical, surgical, social or family history unless described:    PATIENT CARE ACCESS DEVICES  [ ] Peripheral IV  [ ] Central Venous Line, Date Placed:		Site/Device:  [ ] PICC, Date Placed:  [ ] Urinary Catheter, Date Placed:  [ ] Necessity of urinary, arterial, and venous catheters discussed    Review of Systems: If not negative (Neg) please elaborate. History Per:   General: [x ] Neg  Pulmonary: [x ] Neg  Cardiac: [x ] Neg  Gastrointestinal: [x ] Neg  Ears, Nose, Throat: [ x] Neg  Renal/Urologic: [x ] Neg  Musculoskeletal: [ x] Neg  Endocrine: [x ] Neg  Hematologic: [ x] Neg  Neurologic: [x ] Neg  Allergy/Immunologic: [ x] Neg  All other systems reviewed and negative [ x]     Vital Signs Last 24 Hrs  T(C): 36.7 (31 Oct 2019 09:21), Max: 37.2 (30 Oct 2019 17:16)  T(F): 98 (31 Oct 2019 09:21), Max: 98.9 (30 Oct 2019 17:16)  HR: 71 (31 Oct 2019 09:21) (63 - 92)  BP: 111/67 (31 Oct 2019 09:21) (102/62 - 116/73)  BP(mean): 80 (30 Oct 2019 17:16) (71 - 80)  RR: 18 (31 Oct 2019 09:21) (18 - 23)  SpO2: 98% (31 Oct 2019 09:21) (96% - 98%)  I&O's Summary    30 Oct 2019 07:01  -  31 Oct 2019 07:00  --------------------------------------------------------  IN: 420 mL / OUT: 940 mL / NET: -520 mL      Pain Score:  Daily Weight Gm: 64609 (30 Oct 2019 11:49)  BMI (kg/m2): 48.1 (10-30 @ 11:49)    Gen: no apparent distress, appears comfortable  HEENT: normocephalic/atraumatic, moist mucous membranes, pupils equal round and reactive, extraocular movements intact, clear conjunctiva  Neck: supple  Heart: S1S2+, regular rate and rhythm, no murmur, cap refill < 2 sec, 2+ peripheral pulses  Lungs: normal respiratory pattern, clear to auscultation bilaterally  Abd: soft, nontender, nondistended, bowel sounds present, no hepatosplenomegaly  : deferred  Ext: full range of motion, no edema, no tenderness  Neuro: no focal deficits, awake, alert, no acute change from baseline exam  Skin: no rash, intact and not indurated    Interval Lab Results:                        12.2   13.72 )-----------( 279      ( 29 Oct 2019 16:00 )             38.0                         12.8   5.60  )-----------( 275      ( 28 Oct 2019 16:20 )             39.1         INTERVAL IMAGING STUDIES:    < from: MR Brain Stereotactic w/wo IV Cont (10.28.19 @ 22:41) >  INTERPRETATION:  Clinical indication: Ventriculomegaly.     MRI of the brain was performed using sagittal T1, axial T1 T2 T2 FLAIR   diffusion and susceptibility weighted sequence. The patient was injected   with approximately 7 cc of Gadavist IV with 0.5 cc of contrast discarded.   Sagittal coronal and axial T1-weighted sequences were performed.    Postop changes compatible with a right posterior frontal/parietal   craniotomy is again identified. There is evidence of abnormal enhancement   seen along the postop bed. The overall postop bed has decreased in size   when compared with the prior exam.     There is considerable dilatation of the right temporal and occipital horn   of the lateral ventricle identified when compared with the prior MRI   performed on August 8, 2019. There is surrounding T1 and T2 prolongation   identified which could be compatible transependymal flow. This could be   compatible with a trapped ventricle. Previously noted multiloculated   cystic area just medial to the atrium of the right lateral ventricle is   again seen though slightly less conspicuous. This finding currently   measures approximately 1.2 x 0.5 cm and previously measured approximately   1.2 x 1.1 cm.    Uncal herniation is identified with mass effect on the right midbrain.   Subfalcine herniation is identified. Right to left shift (0.9 cm) is   seen. There is localized mass effect consisting sulcal effacement.    Evaluation of the diffusion weighted sequence demonstrates no abnormal   areas of restricted diffusion to suggest acute infarct.    The large vessels demonstrate normal flow voids.    Cavum septum pellucidum and vergae is identified.    Areas of susceptibility in the postop region are identified. This finding   was present on the prior study and likely the result of old areas of   hemorrhage.    Impression: Considerable dilatation of the right temporal region and   occipital horn the right lateral ventricle. This is suspicious for a   trapped ventricle. There is abnormal T2 and T2 prolongation seen around   this portion of the ventricle which is suspicious for transependymal   flow. There is evidence of uncal and subfalcine herniation seen as well   as right to left shift as described above.    < end of copied text >      < from: MR Head No Cont (10.30.19 @ 15:51) >  PROCEDURE DATE:  Oct 30 2019     INTERPRETATION:  Clinical indication: Status post surgery.    MRI of the brain was performed using sagittal T1, axial T1 T2 T2 FLAIR   diffusion and susceptibility weighted sequence. Coronal T1 and   T2-weighted sequences were performed as well.    This exam is compared with prior preop MRI the brain performed on October 20, 2019.    Cavum septum pellucidum and vergae is again seen    Postop changes compatible with a right posterior temporal/parietal   craniotomy. There is evidence of a catheter entering the postop bed with   extension into the right posterior temporal lobe of the right lateral   ventricle. There is considerable decrease in size of the dilated right   lateral ventricle when compared with the prior exam. Surrounding T2   prolongation is again identified. Decrease mass effect on right lateral   ventricle previous seen with decreased right to left shift identified.   Small residual right to left shift is seen measuring approximately 5.0   mm. Bifrontal extra axial air is identified. Extra-axial fluid in the   postop region is seen which measures approximately 0.7 cm. This is   related to postop changes.    Small amount of intraventricular hemorrhage is seen bilaterally.    Areas of abnormal restricted diffusion is seen involving the edges of the   postop bed which could be related postop changes. Small areas of acute   infarct cannot be entirely excluded. Slightly more prominent area of T2   prolongationwith restricted diffusion is seen involving the posterior   body of the corpus callosum. This could be compatible with a small   infarct as well. Minimal mucosal thickening is seen involving the right   maxillary sinus.    Both mastoid and middle earregions appear clear.    Impression: Postop changes as described above. INTERVAL/OVERNIGHT EVENTS:     This is a 18y Female with h/o ependymoma s/p gross total resection in 2012, who presented with headache pain, dizziness, and worsening decreased vision of lateral field of left eye, MRI head with findings consistent with hydrocephalus, s/p right craniotomy with fenestration of intraventricular cyst, opening of cavum septum and placement of R lateral ventricle ommaya, POD #2    Pt seen and examined at bedside this AM with hospitalist attending. Pt reports that she feels better following procedure, reports improvement in her vision of the left eye, has been able to get out of bed to the bathroom, has not yet walked around in the hallway. She is well-tolerating PO intake, with normal voiding.  No reported acute overnight events. No complains of HA    [x ] History per: chart review and patient    [ X] Family Centered Rounds Completed.     MEDICATIONS  (STANDING):  dexAMETHasone  Oral Tab/Cap - Peds   Oral   dexAMETHasone  Oral Tab/Cap - Peds 2 milliGRAM(s) Oral daily  docusate sodium Oral Tab/Cap - Peds 100 milliGRAM(s) Oral two times a day  famotidine IV Intermittent - Peds 20 milliGRAM(s) IV Intermittent every 12 hours  levETIRAcetam  Oral Tab/Cap - Peds 750 milliGRAM(s) Oral two times a day  senna 15 milliGRAM(s) Oral Chewable Tablet - Peds 1 Tablet(s) Chew at bedtime    MEDICATIONS  (PRN):  acetaminophen   Oral Tab/Cap - Peds. 650 milliGRAM(s) Oral every 6 hours PRN Mild Pain (1 - 3), Moderate Pain (4 - 6)  polyethylene glycol 3350 Oral Powder - Peds 17 Gram(s) Oral daily PRN Constipation    Allergies    dust (Rhinorrhea; Rhinitis)  No Known Drug Allergies    Intolerances      Diet: Regular pediatric diet    [x ] There are no updates to the medical, surgical, social or family history unless described:    PATIENT CARE ACCESS DEVICES  [ ] Peripheral IV  [ ] Central Venous Line, Date Placed:		Site/Device:  [ ] PICC, Date Placed:  [ ] Urinary Catheter, Date Placed:  [ ] Necessity of urinary, arterial, and venous catheters discussed    Review of Systems: If not negative (Neg) please elaborate. History Per:   General: [x ] Neg  Pulmonary: [x ] Neg  Cardiac: [x ] Neg  Gastrointestinal: [x ] Neg  Ears, Nose, Throat: [ x] Neg  Renal/Urologic: [x ] Neg  Musculoskeletal: [ x] Neg  Endocrine: [x ] Neg  Hematologic: [ x] Neg  Neurologic: [x ] Neg  Allergy/Immunologic: [ x] Neg  All other systems reviewed and negative [ x]     Vital Signs Last 24 Hrs  T(C): 36.7 (31 Oct 2019 09:21), Max: 37.2 (30 Oct 2019 17:16)  T(F): 98 (31 Oct 2019 09:21), Max: 98.9 (30 Oct 2019 17:16)  HR: 71 (31 Oct 2019 09:21) (63 - 92)  BP: 111/67 (31 Oct 2019 09:21) (102/62 - 116/73)  BP(mean): 80 (30 Oct 2019 17:16) (71 - 80)  RR: 18 (31 Oct 2019 09:21) (18 - 23)  SpO2: 98% (31 Oct 2019 09:21) (96% - 98%)  I&O's Summary    30 Oct 2019 07:01  -  31 Oct 2019 07:00  --------------------------------------------------------  IN: 420 mL / OUT: 940 mL / NET: -520 mL      Pain Score:  Daily Weight Gm: 34259 (30 Oct 2019 11:49)  BMI (kg/m2): 48.1 (10-30 @ 11:49)    Gen: no apparent distress, appears comfortable  HEENT: normocephalic/atraumatic, moist mucous membranes, pupils equal round and reactive, extraocular movements intact, clear conjunctiva  Neck: supple  Heart: S1S2+, regular rate and rhythm, no murmur, cap refill < 2 sec, 2+ peripheral pulses  Lungs: normal respiratory pattern, clear to auscultation bilaterally  Abd: soft, nontender, nondistended, bowel sounds present, no hepatosplenomegaly  : deferred  Ext: full range of motion, no edema, no tenderness  Neuro: no focal deficits, awake, alert, no acute change from baseline exam  Skin: no rash, intact and not indurated    Interval Lab Results:                        12.2   13.72 )-----------( 279      ( 29 Oct 2019 16:00 )             38.0                         12.8   5.60  )-----------( 275      ( 28 Oct 2019 16:20 )             39.1         INTERVAL IMAGING STUDIES:    < from: MR Brain Stereotactic w/wo IV Cont (10.28.19 @ 22:41) >  INTERPRETATION:  Clinical indication: Ventriculomegaly.     MRI of the brain was performed using sagittal T1, axial T1 T2 T2 FLAIR   diffusion and susceptibility weighted sequence. The patient was injected   with approximately 7 cc of Gadavist IV with 0.5 cc of contrast discarded.   Sagittal coronal and axial T1-weighted sequences were performed.    Postop changes compatible with a right posterior frontal/parietal   craniotomy is again identified. There is evidence of abnormal enhancement   seen along the postop bed. The overall postop bed has decreased in size   when compared with the prior exam.     There is considerable dilatation of the right temporal and occipital horn   of the lateral ventricle identified when compared with the prior MRI   performed on August 8, 2019. There is surrounding T1 and T2 prolongation   identified which could be compatible transependymal flow. This could be   compatible with a trapped ventricle. Previously noted multiloculated   cystic area just medial to the atrium of the right lateral ventricle is   again seen though slightly less conspicuous. This finding currently   measures approximately 1.2 x 0.5 cm and previously measured approximately   1.2 x 1.1 cm.    Uncal herniation is identified with mass effect on the right midbrain.   Subfalcine herniation is identified. Right to left shift (0.9 cm) is   seen. There is localized mass effect consisting sulcal effacement.    Evaluation of the diffusion weighted sequence demonstrates no abnormal   areas of restricted diffusion to suggest acute infarct.    The large vessels demonstrate normal flow voids.    Cavum septum pellucidum and vergae is identified.    Areas of susceptibility in the postop region are identified. This finding   was present on the prior study and likely the result of old areas of   hemorrhage.    Impression: Considerable dilatation of the right temporal region and   occipital horn the right lateral ventricle. This is suspicious for a   trapped ventricle. There is abnormal T2 and T2 prolongation seen around   this portion of the ventricle which is suspicious for transependymal   flow. There is evidence of uncal and subfalcine herniation seen as well   as right to left shift as described above.    < end of copied text >      < from: MR Head No Cont (10.30.19 @ 15:51) >  PROCEDURE DATE:  Oct 30 2019     INTERPRETATION:  Clinical indication: Status post surgery.    MRI of the brain was performed using sagittal T1, axial T1 T2 T2 FLAIR   diffusion and susceptibility weighted sequence. Coronal T1 and   T2-weighted sequences were performed as well.    This exam is compared with prior preop MRI the brain performed on October 20, 2019.    Cavum septum pellucidum and vergae is again seen    Postop changes compatible with a right posterior temporal/parietal   craniotomy. There is evidence of a catheter entering the postop bed with   extension into the right posterior temporal lobe of the right lateral   ventricle. There is considerable decrease in size of the dilated right   lateral ventricle when compared with the prior exam. Surrounding T2   prolongation is again identified. Decrease mass effect on right lateral   ventricle previous seen with decreased right to left shift identified.   Small residual right to left shift is seen measuring approximately 5.0   mm. Bifrontal extra axial air is identified. Extra-axial fluid in the   postop region is seen which measures approximately 0.7 cm. This is   related to postop changes.    Small amount of intraventricular hemorrhage is seen bilaterally.    Areas of abnormal restricted diffusion is seen involving the edges of the   postop bed which could be related postop changes. Small areas of acute   infarct cannot be entirely excluded. Slightly more prominent area of T2   prolongationwith restricted diffusion is seen involving the posterior   body of the corpus callosum. This could be compatible with a small   infarct as well. Minimal mucosal thickening is seen involving the right   maxillary sinus.    Both mastoid and middle earregions appear clear.    Impression: Postop changes as described above.

## 2019-10-31 NOTE — PROGRESS NOTE PEDS - PROVIDER SPECIALTY LIST PEDS
Anesthesia
Critical Care
Hospitalist
Hospitalist
Neurosurgery

## 2019-10-31 NOTE — PROGRESS NOTE PEDS - ATTENDING COMMENTS
Agree with above history, physical, assessment & plan and have made edits where appropriate.    ATTENDING ATTESTATION:    The patient was seen, examined and discussed with resident team. Agree with above as documented which I have reviewed and edited where appropriate. I have reviewed laboratory and radiology results. I have spoken with parents and consultants regarding the patient's care.    I was physically present for the evaluation and management services provided.  I agree with the included history, physical and plan which I reviewed and edited where appropriate.  I spent > 35 minutes with the patient and the patient's family, more than 50% of visit was spent counseling and/or coordinating care by the attending physician.     Elayne Maher MD  Pediatric Hospitalist Attending  #[id number]

## 2019-11-04 LAB — BACTERIA CSF CULT: SIGNIFICANT CHANGE UP

## 2019-11-18 NOTE — PRE-OP CHECKLIST, PEDIATRIC - AICD PRESENT
Bacitracin and dressing were applied to both legs and the big toe on the left foot.     Amelia Masters  11/18/19 1027    
no

## 2019-12-27 ENCOUNTER — EMERGENCY (EMERGENCY)
Age: 19
LOS: 1 days | Discharge: HOME | End: 2019-12-27
Attending: EMERGENCY MEDICINE | Admitting: EMERGENCY MEDICINE
Payer: COMMERCIAL

## 2019-12-27 VITALS
SYSTOLIC BLOOD PRESSURE: 105 MMHG | RESPIRATION RATE: 16 BRPM | TEMPERATURE: 98 F | HEART RATE: 100 BPM | WEIGHT: 159.28 LBS | OXYGEN SATURATION: 99 % | DIASTOLIC BLOOD PRESSURE: 61 MMHG

## 2019-12-27 DIAGNOSIS — C71.9 MALIGNANT NEOPLASM OF BRAIN, UNSPECIFIED: Chronic | ICD-10-CM

## 2019-12-27 LAB
ALBUMIN SERPL ELPH-MCNC: 5.1 G/DL — HIGH (ref 3.3–5)
ALP SERPL-CCNC: 95 U/L — SIGNIFICANT CHANGE UP (ref 40–120)
ALT FLD-CCNC: 12 U/L — SIGNIFICANT CHANGE UP (ref 4–33)
ANION GAP SERPL CALC-SCNC: 21 MMO/L — HIGH (ref 7–14)
AST SERPL-CCNC: 18 U/L — SIGNIFICANT CHANGE UP (ref 4–32)
BASOPHILS # BLD AUTO: 0.03 K/UL — SIGNIFICANT CHANGE UP (ref 0–0.2)
BASOPHILS NFR BLD AUTO: 0.3 % — SIGNIFICANT CHANGE UP (ref 0–2)
BILIRUB SERPL-MCNC: 0.4 MG/DL — SIGNIFICANT CHANGE UP (ref 0.2–1.2)
BUN SERPL-MCNC: 12 MG/DL — SIGNIFICANT CHANGE UP (ref 7–23)
CALCIUM SERPL-MCNC: 10.2 MG/DL — SIGNIFICANT CHANGE UP (ref 8.4–10.5)
CHLORIDE SERPL-SCNC: 97 MMOL/L — LOW (ref 98–107)
CO2 SERPL-SCNC: 21 MMOL/L — LOW (ref 22–31)
CREAT SERPL-MCNC: 0.73 MG/DL — SIGNIFICANT CHANGE UP (ref 0.5–1.3)
EOSINOPHIL # BLD AUTO: 0 K/UL — SIGNIFICANT CHANGE UP (ref 0–0.5)
EOSINOPHIL NFR BLD AUTO: 0 % — SIGNIFICANT CHANGE UP (ref 0–6)
GLUCOSE SERPL-MCNC: 113 MG/DL — HIGH (ref 70–99)
HCT VFR BLD CALC: 43.7 % — SIGNIFICANT CHANGE UP (ref 34.5–45)
HGB BLD-MCNC: 14.1 G/DL — SIGNIFICANT CHANGE UP (ref 11.5–15.5)
IMM GRANULOCYTES NFR BLD AUTO: 0.2 % — SIGNIFICANT CHANGE UP (ref 0–1.5)
LYMPHOCYTES # BLD AUTO: 0.77 K/UL — LOW (ref 1–3.3)
LYMPHOCYTES # BLD AUTO: 8.6 % — LOW (ref 13–44)
MCHC RBC-ENTMCNC: 31.6 PG — SIGNIFICANT CHANGE UP (ref 27–34)
MCHC RBC-ENTMCNC: 32.3 % — SIGNIFICANT CHANGE UP (ref 32–36)
MCV RBC AUTO: 98 FL — SIGNIFICANT CHANGE UP (ref 80–100)
MONOCYTES # BLD AUTO: 0.14 K/UL — SIGNIFICANT CHANGE UP (ref 0–0.9)
MONOCYTES NFR BLD AUTO: 1.6 % — LOW (ref 2–14)
NEUTROPHILS # BLD AUTO: 7.95 K/UL — HIGH (ref 1.8–7.4)
NEUTROPHILS NFR BLD AUTO: 89.3 % — HIGH (ref 43–77)
NRBC # FLD: 0 K/UL — SIGNIFICANT CHANGE UP (ref 0–0)
PLATELET # BLD AUTO: 353 K/UL — SIGNIFICANT CHANGE UP (ref 150–400)
PMV BLD: 10.8 FL — SIGNIFICANT CHANGE UP (ref 7–13)
POTASSIUM SERPL-MCNC: 3.7 MMOL/L — SIGNIFICANT CHANGE UP (ref 3.5–5.3)
POTASSIUM SERPL-SCNC: 3.7 MMOL/L — SIGNIFICANT CHANGE UP (ref 3.5–5.3)
PROT SERPL-MCNC: 8.6 G/DL — HIGH (ref 6–8.3)
RBC # BLD: 4.46 M/UL — SIGNIFICANT CHANGE UP (ref 3.8–5.2)
RBC # FLD: 11.8 % — SIGNIFICANT CHANGE UP (ref 10.3–14.5)
SODIUM SERPL-SCNC: 139 MMOL/L — SIGNIFICANT CHANGE UP (ref 135–145)
WBC # BLD: 8.91 K/UL — SIGNIFICANT CHANGE UP (ref 3.8–10.5)
WBC # FLD AUTO: 8.91 K/UL — SIGNIFICANT CHANGE UP (ref 3.8–10.5)

## 2019-12-27 PROCEDURE — 99284 EMERGENCY DEPT VISIT MOD MDM: CPT

## 2019-12-27 PROCEDURE — 93010 ELECTROCARDIOGRAM REPORT: CPT

## 2019-12-27 PROCEDURE — 70553 MRI BRAIN STEM W/O & W/DYE: CPT | Mod: 26

## 2019-12-27 RX ORDER — ACETAMINOPHEN 500 MG
650 TABLET ORAL ONCE
Refills: 0 | Status: COMPLETED | OUTPATIENT
Start: 2019-12-27 | End: 2019-12-27

## 2019-12-27 RX ADMIN — Medication 650 MILLIGRAM(S): at 21:04

## 2019-12-27 NOTE — ED PEDIATRIC NURSE REASSESSMENT NOTE - NS ED NURSE REASSESS COMMENT FT2
Patient remains awake and alert, neurologically intact. Patient with 22g in LAC, clean/dry/intact. Patient transported safely to MRI now with ED seng Coates. Awaiting disposition, will continue monitor.

## 2019-12-27 NOTE — ED PROVIDER NOTE - OBJECTIVE STATEMENT
17 y/o F pmhx ependymoma s/p resection 8/2012 s/p endoscopic resection of intraventricular arachnoid cyst on 8/6/19 and repeat resection   This morning started have headache and pressure throughout the head. Rating pain 5/10 now, in AM was 7/10. No medications for the pain. No vision changes. No nausea. No vomiting. No fevers. No cough. No congestion. Around 3pm before taking a shower had a fainting episode. She fell forward hitting head. Was out of it for 5 minutes. 19 y/o F pmhx ependymoma s/p resection 8/2012 s/p endoscopic resection of intraventricular arachnoid cyst on 8/6/19 and repeat resection 10/2019 when she presented with headache and vision changes. Patient well until this AM when she developed headache. This morning started have headache and pressure throughout the head. Rating pain 5/10 now, in AM was 7/10. No medications for the pain. No vision changes. No nausea. No vomiting. No fevers. No cough. No congestion. Around 3pm before taking a shower had a fainting episode. She fell forward hitting head. Was out of it for 5 minutes and was still a little confused per mother for a couple hours after. 18 y/o F pmhx ependymoma s/p resection 8/2012 s/p endoscopic resection of intraventricular arachnoid cyst on 8/6/19 and repeat resection 10/2019 when she presented with headache and vision changes then. Patient well until this AM when she developed headache. This morning started have headache and pressure throughout the head. Rating pain 5/10 now, in AM was 7/10. No medications for the pain. No vision changes. No nausea. No vomiting. No fevers. No cough. No congestion. Around 3pm before taking a shower had a fainting episode. She fell forward hitting head. Was out of it for 5 minutes and was still a little confused per mother for a couple hours after.

## 2019-12-27 NOTE — CONSULT NOTE PEDS - SUBJECTIVE AND OBJECTIVE BOX
p ([id number])     HPI: Justina Betancourt, 18 y/o F pmhx ependymoma s/p resection 8/2012 s/p endoscopic fenestration of intraventricular arachnoid cyst on 8/6/19 and repeat cyst fenestration with placement of ommaya in 10/2019. Presents in ER after syncopal episode at 3pm when showering. Does not recall event. Mild HA. No n/v. Incision well healed, no fevers/chills.    PAST MEDICAL HISTORY   Irregular menses  Deafness  History of headache  Arachnoid cyst  Brain ependymoma  Chronic Serous Otitis Media of Both Ears  Migraine Headache  Congenital Hearing Loss    PAST SURGICAL HISTORY   Brain ependymoma  Status Post Myringotomy with Insertion of Tube  Congenital Hearing Loss  Migraine Headache    dust (Rhinorrhea; Rhinitis)      MEDICATIONS:  Antibiotics:    Neuro:    Anticoagulation:    Other:      SOCIAL HISTORY:   Occupation:   Marital Status:     FAMILY HISTORY:      REVIEW OF SYSTEMS:  Check here if all are normal other than Neurological/HPI [x]  General:  Eyes:  ENT:  Cardiac:  Respiratory:  GI:  Musculoskeletal:   Skin:  Neurologic:   Psychiatric:     PHYSICAL EXAMINATION:   T(C): 36.6 (12-27-19 @ 18:00), Max: 36.6 (12-27-19 @ 18:00)  HR: 100 (12-27-19 @ 18:00) (100 - 100)  BP: 105/61 (12-27-19 @ 18:00) (105/61 - 105/61)  RR: 16 (12-27-19 @ 18:00) (16 - 16)  SpO2: 99% (12-27-19 @ 18:00) (99% - 99%)  Wt(kg): --  Weight (kg): 72.25 (12-27 @ 18:00)      Neurologic Examination:           AOx3, FC, PERRL, EOMI, no facial   5/5 throughout, no drift  Incision well healed

## 2019-12-27 NOTE — ED PROVIDER NOTE - CARE PROVIDER_API CALL
Chandler Beckham (MD)  Neurological Surgery; Pediatric Neurological Surgery  [address], [address]  [place], [place] [id number]  Phone: [phone]  Fax: [phone]  Follow Up Time:

## 2019-12-27 NOTE — CONSULT NOTE PEDS - ASSESSMENT
Justina Betancourt, 18 y/o F pmhx ependymoma s/p resection 8/2012 s/p endoscopic fenestration of intraventricular arachnoid cyst on 8/6/19 and repeat cyst fenestration with placement of ommaya in 10/2019. Presents in ER after syncopal episode at 3pm when showering. Does not recall event. Mild HA. No n/v. Incision well healed, no fevers/chills.     PLAN: MRI brain p

## 2019-12-27 NOTE — ED PROVIDER NOTE - NEUROLOGICAL, MLM
Alert and oriented, no focal deficits, no motor or sensory deficits. CN 2-12 intact, sensation intact, motor 5/5, normal gait.

## 2019-12-27 NOTE — ED PEDIATRIC NURSE REASSESSMENT NOTE - NS ED NURSE REASSESS COMMENT FT2
Patient remains awake and alert with parents at the bedside. Awaiting MRI read, parents aware of delays, will continue to monitor.

## 2019-12-27 NOTE — ED PROVIDER NOTE - CLINICAL SUMMARY MEDICAL DECISION MAKING FREE TEXT BOX
20 y/o F pmhx ependymoma s/p resection 8/2012 s/p endoscopic resection of intraventricular arachnoid cyst on 8/6/19 and repeat resection 10/2019 presenting today with headache and episode of syncope. Concern for recurrent cyst formation. Will consult neurosurgery and obtain imagining CT versus MRI. Will place IV and obtain labs. XANDER. MARILIN Carver MD PEM Attending 20 y/o F PMH ependymoma s/p resection 8/2012 s/p endoscopic resection of intraventricular arachnoid cyst on 8/6/19 and repeat resection 10/2019 presenting today with headache and episode of syncope. Concern for recurrent cyst formation. Will consult neurosurgery and obtain imaging CT versus MRI. Will place IV and obtain labs. XANDER. MARILIN Carver MD PEM Attending

## 2019-12-27 NOTE — ED PROVIDER NOTE - PROGRESS NOTE DETAILS
Seen by neurosurgery, recommended MRI head with and without contrast. MARILIN Carver MD [facility] Attending MRI reviewed by neurosurgery and radiology, MRI appears improve with decreased ventricular size. Patient improved pain after Tylenol. Stable for discharge home. MARILIN Carver MD [facility] Attending MRI reviewed by neurosurgery and radiology, MRI appears improved with decreased ventricular size. Patient with improved pain after Tylenol. Stable for discharge home. Follow up NSX outpatient. MARILIN Carver MD [facility] Attending

## 2019-12-27 NOTE — ED PROVIDER NOTE - PATIENT PORTAL LINK FT
You can access the FollowMyHealth Patient Portal offered by [facility] by registering at the following website: [email or web address]. By joining [employer]’s FollowMyHealth portal, you will also be able to view your health information using other applications (apps) compatible with our system.

## 2019-12-27 NOTE — ED PROVIDER NOTE - ENMT, MLM
Airway patent, Nasal mucosa clear. Mouth with normal mucosa. Throat has no vesicles, no oropharyngeal exudates and uvula is midline. B/l hearing aids in place.

## 2019-12-27 NOTE — ED PROVIDER NOTE - NSFOLLOWUPINSTRUCTIONS_ED_ALL_ED_FT
Please see your doctor in 1-2 days  Please call Dr. Beckham on Monday to make follow up appoint for within next 1-2 weeks.   Return for worsening pain, changes in vision, any other concerning notes.     General Headache in Children    WHAT YOU NEED TO KNOW:    Headache pain may be mild or severe. Common causes include stress, medicines, and head injuries. Sleep problems, allergies, and hormone changes can also cause a headache. Your child may have frequent headaches that have no clear cause. Pain may start in another part of your child's body and move to his or her head. Headache pain can also move to other parts of your child's body. A headache can cause other symptoms, such as nausea and vomiting. A severe headache may be a sign of a stroke or other serious problem that needs immediate treatment.    DISCHARGE INSTRUCTIONS:    Call 911 for any of the following: Your child has any of the following signs of a stroke:     Numbness or drooping on one side of his or her face     Weakness in an arm or leg    Confusion or difficulty speaking    Dizziness or a severe headache    Changes to his or her vision, or vision loss    Return to the emergency department if:     Your child has a headache with neck stiffness and a fever.    Your child has a constant headache and is vomiting.    Your child has severe pain that does not get better after he or she takes pain medicine.    Your child has a headache and the pain worsens when he or she looks into light.    Your child has a headache and vision changes, such as blurred vision.    Your child has a headache and is forgetful or confused.    Contact your child's healthcare provider if:     Your child has a headache each day that does not get better, even after treatment.    Your child has changes in headaches, or new symptoms that occur when he or she has a headache.    Others who live or work with your child also have headaches.    You have questions or concerns about your child's condition or care.    Medicines: Your child may need any of the following:     Medicines may be given to prevent or treat headache pain. Do not wait until the pain is severe to give your child the medicine. Ask your child's healthcare provider how to give the medicine safely.     Antinausea medicine may be given to calm your child's stomach and help prevent vomiting.    NSAIDs, such as ibuprofen, help decrease swelling, pain, and fever. This medicine is available with or without a doctor's order. NSAIDs can cause stomach bleeding or kidney problems in certain people. If your child takes blood thinner medicine, always ask if NSAIDs are safe for him. Always read the medicine label and follow directions. Do not give these medicines to children under 6 months of age without direction from your child's healthcare provider.    Acetaminophen decreases pain and fever. It is available without a doctor's order. Ask how much to give your child and how often to give it. Follow directions. Read the labels of all other medicines your child uses to see if they also contain acetaminophen, or ask your child's doctor or pharmacist. Acetaminophen can cause liver damage if not taken correctly.    Do not give aspirin to children under 18 years of age. Your child could develop Reye syndrome if he takes aspirin. Reye syndrome can cause life-threatening brain and liver damage. Check your child's medicine labels for aspirin, salicylates, or oil of wintergreen.     Give your child's medicine as directed. Contact your child's healthcare provider if you think the medicine is not working as expected. Tell him or her if your child is allergic to any medicine. Keep a current list of the medicines, vitamins, and herbs your child takes. Include the amounts, and when, how, and why they are taken. Bring the list or the medicines in their containers to follow-up visits. Carry your child's medicine list with you in case of an emergency.    Manage your child's symptoms:     Have your child rest in a dark and quiet room. This may help decrease your child's pain.    Apply heat or ice as directed. Heat and ice help decrease pain, and heat also decreases muscle spasms. Use a heat or ice pack. For ice, you can also put crushed ice in a plastic bag. Cover the pack or bag with a towel before you apply it to your child's skin. Apply heat or ice on the area for 20 minutes every 2 hours for as many days as directed. Your healthcare provider may recommend that you alternate heat and ice.    Have your child relax muscles to help relieve a headache. Have your child lie down in a comfortable position and close his or her eyes. Tell him or her to relax muscles slowly, starting at the toes and working up the body. A massage or warm bath may also help relax the muscles.    Keep a headache record: Record the dates and times that your child gets headaches. Include what he or she was doing before the headache started. Also record anything your child ate or drank in the 24 hours before the headache started. This might help your healthcare provider find the cause of your child's headaches and make a treatment plan. The record can also help your child avoid headache triggers or manage symptoms.    Help your child get enough sleep: Your child should get 8 to 10 hours of sleep each night. Help your child create a sleep schedule. Have your child go to bed and wake up at the same times each day. It may be helpful for your child to do something relaxing before bed. Do not let your child watch television right before bed.    Have your child drink liquids as directed: Your child may need to drink more liquid to prevent dehydration. Dehydration can cause a headache. Ask your child's healthcare provider how much liquid your child needs each day and which liquids are best for him or her. Have your child limit caffeine as directed. Headaches may be triggered by caffeine. Your child may also develop a headache if he or she drinks caffeine regularly and suddenly stops.    Offer your child a variety of healthy foods: Do not let your child skip meals. Too little food can trigger a headache. Include fruits, vegetables, whole-grain breads, low-fat dairy products, beans, lean meat, and fish. Do not let your child have trigger foods, such as chocolate. Foods that contain gluten, nitrates, MSG, or artificial sweeteners may also trigger a headache.    Talk to your adolescent about not smoking: Nicotine and other chemicals in cigarettes and cigars can trigger a headache or make it worse. Do not smoke around your child or let anyone else smoke around your child. Secondhand smoke can also trigger a headache or make it worse. Ask your adolescent's healthcare provider for information if he or she currently smokes and needs help to quit. E-cigarettes or smokeless tobacco still contain nicotine. Talk to your adolescent's healthcare provider before he or she uses these products.     Follow up with your child's healthcare provider as directed: Write down your questions so you remember to ask them during your child's visits.

## 2019-12-27 NOTE — ED PEDIATRIC NURSE NOTE - NSIMPLEMENTINTERV_GEN_ALL_ED
Implemented All Universal Safety Interventions:  [place] to call system. Call bell, personal items and telephone within reach. Instruct patient to call for assistance. Room bathroom lighting operational. Non-slip footwear when patient is off stretcher. Physically safe environment: no spills, clutter or unnecessary equipment. Stretcher in lowest position, wheels locked, appropriate side rails in place.

## 2019-12-27 NOTE — ED PROVIDER NOTE - ENMT NEGATIVE STATEMENT, MLM
B/l hearing aids in place, Nose: no nasal congestion and no nasal drainage. Mouth/Throat: no dysphagia, no hoarseness and no throat pain.Neck: no lumps, no pain, no stiffness and no swollen glands.

## 2019-12-27 NOTE — ED PROVIDER NOTE - PSH
[place] [facility] [place] with Insertion of Tube

## 2019-12-27 NOTE — ED PEDIATRIC NURSE NOTE - PSH
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

## 2019-12-28 VITALS
DIASTOLIC BLOOD PRESSURE: 59 MMHG | HEART RATE: 79 BPM | TEMPERATURE: 98 F | SYSTOLIC BLOOD PRESSURE: 118 MMHG | RESPIRATION RATE: 18 BRPM | OXYGEN SATURATION: 100 %

## 2019-12-28 NOTE — ED PEDIATRIC NURSE REASSESSMENT NOTE - NS ED NURSE REASSESS COMMENT FT2
pt awake and alert, vital signs stable, no distress noted, denies pain at the moment, approved for discharge by MD

## 2019-12-28 NOTE — CHART NOTE - NSCHARTNOTEFT_GEN_A_CORE
MR reviewed. Vents appear decreased in size per Dr. Beckham and radiology. May fu outpatient 1-2 weeks after discharge

## 2019-12-30 ENCOUNTER — APPOINTMENT (OUTPATIENT)
Dept: PEDIATRIC NEUROLOGY | Facility: CLINIC | Age: 19
End: 2019-12-30
Payer: COMMERCIAL

## 2019-12-30 VITALS
HEIGHT: 67 IN | WEIGHT: 154 LBS | DIASTOLIC BLOOD PRESSURE: 72 MMHG | SYSTOLIC BLOOD PRESSURE: 108 MMHG | HEART RATE: 7 BPM | BODY MASS INDEX: 24.17 KG/M2

## 2019-12-30 DIAGNOSIS — R51 HEADACHE: ICD-10-CM

## 2019-12-30 DIAGNOSIS — R42 DIZZINESS AND GIDDINESS: ICD-10-CM

## 2019-12-30 PROCEDURE — 99205 OFFICE O/P NEW HI 60 MIN: CPT

## 2019-12-30 RX ORDER — METOCLOPRAMIDE HYDROCHLORIDE 10 MG/1
10 TABLET, ORALLY DISINTEGRATING ORAL
Qty: 15 | Refills: 0 | Status: ACTIVE | COMMUNITY
Start: 2019-12-30 | End: 1900-01-01

## 2019-12-30 RX ORDER — IBUPROFEN 400 MG/1
400 TABLET, FILM COATED ORAL
Qty: 30 | Refills: 0 | Status: ACTIVE | COMMUNITY
Start: 2019-12-30 | End: 1900-01-01

## 2019-12-31 NOTE — REASON FOR VISIT
[Initial Consultation] : an initial consultation for [Syncope] : syncope [Mother] : mother [Medical Records] : medical records [[employer] ] : provided by [employer]   [TWNoteComboBox1] : [place]

## 2019-12-31 NOTE — ASSESSMENT
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 But considering recent evaluation in ER with normal ICP, this is less likely.\par \par We will obtain routine and AEEG to rule out abnormal epileptiform discharges

## 2019-12-31 NOTE — HISTORY OF PRESENT ILLNESS
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 Presented  in ER on  10/27 after syncopal episode at 3pm when showering. She does not recall event. In ER she had MRI brain which was stable with normal pressure in shunt. She was refereed by neurosurgery for further evalution. \par \par Since Friday, she is complaining of nausea, vomiting and headaches. She gets throbbing quality of headaches with nausea and dizziness  especially when she is walking or suddenly changes her position ( from supine to standing).. Headaches resolved in 3-5 mins after she lies down and with sleep.\par There is no photophobia, phonophobia, vision  changes with these episodes. \par She was walking this morning, had palpitation, dizziness and sweating, and started with headaches and then fell down. Does remember the whole episode with no head injury.\par \par There is no family history of migraines\par She is currently not on any meds\par As per as mother she is more sleepy recently then before\par \par \par \par \par

## 2019-12-31 NOTE — CONSULT LETTER
[Dear  ___] : Dear  [unfilled], [Consult Letter:] : I had the pleasure of evaluating your patient, [unfilled]. [Please see my note below.] : Please see my note below. [Consult Closing:] : Thank you very much for allowing me to participate in the care of this patient.  If you have any questions, please do not hesitate to contact me. [Sincerely,] : Sincerely, [FreeTextEntry3] : Jax Tyler\par Child Neurology \par Resident Physician\par

## 2019-12-31 NOTE — PLAN
[FreeTextEntry1] : 1) routine and AEEG\par 2)10mg  Reglan and 400mg Motrin for vomiting and headaches respectively \par 3) Maintain adequate hydration\par 4) Follow up with Neurosurgery \par 5) Informed to visit ER if her symptoms persists or worsens

## 2019-12-31 NOTE — PHYSICAL EXAM
[Well-appearing] : well-appearing [Normocephalic] : normocephalic [No dysmorphic facial features] : no dysmorphic facial features [No ocular abnormalities] : no ocular abnormalities [Neck supple] : neck supple [Lungs clear] : lungs clear [Soft] : soft [Heart sounds regular in rate and rhythm] : heart sounds regular in rate and rhythm [No organomegaly] : no organomegaly [Straight] : straight [No abnormal neurocutaneous stigmata or skin lesions] : no abnormal neurocutaneous stigmata or skin lesions [No deformities] : no deformities [No rhonda or dimples] : no rhonda or dimples [Alert] : alert [Well related, good eye contact] : well related, good eye contact [Normal speech and language] : normal speech and language [Conversant] : conversant [Follows instructions well] : follows instructions well [VFF] : VFF [Pupils reactive to light and accommodation] : pupils reactive to light and accommodation [Full extraocular movements] : full extraocular movements [No nystagmus] : no nystagmus [No papilledema] : no papilledema [Normal facial sensation to light touch] : normal facial sensation to light touch [Gross hearing intact] : gross hearing intact [No facial asymmetry or weakness] : no facial asymmetry or weakness [Equal palate elevation] : equal palate elevation [Good shoulder shrug] : good shoulder shrug [Normal tongue movement] : normal tongue movement [Midline tongue, no fasciculations] : midline tongue, no fasciculations [Normal axial and appendicular muscle tone] : normal axial and appendicular muscle tone [No pronator drift] : no pronator drift [Gets up on table without difficulty] : gets up on table without difficulty [Normal finger tapping and fine finger movements] : normal finger tapping and fine finger movements [No abnormal involuntary movements] : no abnormal involuntary movements [Walks and runs well] : walks and runs well [5/5 strength in proximal and distal muscles of arms and legs] : 5/5 strength in proximal and distal muscles of arms and legs [Able to do deep knee bend] : able to do deep knee bend [Able to walk on heels] : able to walk on heels [Able to walk on toes] : able to walk on toes [2+ biceps] : 2+ biceps [Triceps] : triceps [Knee jerks] : knee jerks [Ankle jerks] : ankle jerks [No ankle clonus] : no ankle clonus [Localizes LT and temperature] : localizes LT and temperature [No dysmetria on FTNT] : no dysmetria on FTNT [Good walking balance] : good walking balance [Able to tandem well] : able to tandem well [Normal gait] : normal gait [Negative Romberg] : negative Romberg

## 2019-12-31 NOTE — QUALITY MEASURES
[Classification of primary headache syndrome based on latest version of International Classification of  Headache Disorders was performed] : Classification of primary headache syndrome based on latest version of International Classification of Headache Disorders was performed: Yes [Overuse of OTC and prescribed analgesics assessed] : Overuse of OTC and prescribed analgesics assessed: Yes [Lifestyle factors including diet, exercise and sleep hygiene discussed] : Lifestyle factors including diet, exercise and sleep hygiene discussed: Yes [Treatment plan for headache including  pharmacological (abortive and preventive) and nonpharmacological (nutraceutical and bio-behavioral) interventions] : Treatment plan for headache including  pharmacological (abortive and preventive) and nonpharmacological (nutraceutical and bio-behavioral) interventions: Yes

## 2019-12-31 NOTE — DATA REVIEWED
[FreeTextEntry1] : MRI brain Dec 2019\par Right temporal occipital postoperative changes decreased size of the previously dilated right temporal occipital horns compared with 10/30/2019. No acute infarcts or hemorrhage. No hydrocephalus. Resolving postoperative changes.\par \par

## 2020-01-13 ENCOUNTER — APPOINTMENT (OUTPATIENT)
Dept: PEDIATRIC NEUROLOGY | Facility: CLINIC | Age: 20
End: 2020-01-13

## 2020-01-13 VITALS
DIASTOLIC BLOOD PRESSURE: 70 MMHG | SYSTOLIC BLOOD PRESSURE: 111 MMHG | HEART RATE: 76 BPM | BODY MASS INDEX: 24.17 KG/M2 | WEIGHT: 154 LBS | HEIGHT: 67 IN

## 2020-01-14 ENCOUNTER — APPOINTMENT (OUTPATIENT)
Dept: PEDIATRIC NEUROLOGY | Facility: CLINIC | Age: 20
End: 2020-01-14
Payer: COMMERCIAL

## 2020-01-14 PROCEDURE — 95816 EEG AWAKE AND DROWSY: CPT

## 2020-03-23 ENCOUNTER — APPOINTMENT (OUTPATIENT)
Dept: OPHTHALMOLOGY | Facility: CLINIC | Age: 20
End: 2020-03-23

## 2020-09-03 NOTE — ASU PATIENT PROFILE, PEDIATRIC - PAIN SCALE PREFERRED, PROFILE
.  Last office visit 7/23/2020     Last written 8-6-2020 90 with 0      Next office visit scheduled Visit date not found    Requested Prescriptions     Pending Prescriptions Disp Refills    oxyCODONE-acetaminophen (PERCOCET) 5-325 MG per tablet [Pharmacy Med Name: OXYCOD/ACETAMINO 5-325 MG TABS] 90 tablet 0     Sig: TAKE 1 TABLET BY MOUTH EVERY 8 HOURS AS NEEDED FOR PAIN FOR UP TO 30 DAYS.  -REDUCE DOSES TAKEN AS PAIN BECOMES MANAGEABLE
numerical 0-10

## 2020-10-29 NOTE — DISCHARGE NOTE PROVIDER - NSCORESITESY/N_GEN_A_CORE_RD
Blood work- Celiac studies     Stool studies    Start taking 1 tablespoon per day of a powdered fiber supplement: Benefiber    Consider colonoscopy if no improvement    No

## 2021-02-13 ENCOUNTER — APPOINTMENT (OUTPATIENT)
Dept: MRI IMAGING | Facility: HOSPITAL | Age: 21
End: 2021-02-13

## 2022-02-10 NOTE — PROGRESS NOTE PEDS - REASON FOR ADMISSION
This encounter was created in error - please disregard.   endoscopic resection of intraventricular arachnoid cyst.

## 2022-07-28 ENCOUNTER — OFFICE (OUTPATIENT)
Dept: URBAN - METROPOLITAN AREA CLINIC 94 | Facility: CLINIC | Age: 22
Setting detail: OPHTHALMOLOGY
End: 2022-07-28
Payer: COMMERCIAL

## 2022-07-28 DIAGNOSIS — H04.123: ICD-10-CM

## 2022-07-28 PROCEDURE — 92002 INTRM OPH EXAM NEW PATIENT: CPT | Performed by: OPHTHALMOLOGY

## 2022-07-28 ASSESSMENT — VISUAL ACUITY
OD_BCVA: 20/40-1
OS_BCVA: 20/80

## 2022-07-28 ASSESSMENT — KERATOMETRY
OD_AXISANGLE_DEGREES: 077
OS_AXISANGLE_DEGREES: 105
OS_K2POWER_DIOPTERS: 44.00
OD_K2POWER_DIOPTERS: 43.50
OD_K1POWER_DIOPTERS: 42.25
OS_K1POWER_DIOPTERS: 42.50

## 2022-07-28 ASSESSMENT — REFRACTION_AUTOREFRACTION
OD_CYLINDER: -1.50
OD_SPHERE: -3.75
OS_CYLINDER: -1.50
OS_AXIS: 026
OS_SPHERE: -3.25
OD_AXIS: 170

## 2022-07-28 ASSESSMENT — CONFRONTATIONAL VISUAL FIELD TEST (CVF)
OD_FINDINGS: FULL
OS_FINDINGS: FULL

## 2022-07-28 ASSESSMENT — SUPERFICIAL PUNCTATE KERATITIS (SPK)
OD_SPK: 1+
OS_SPK: 1+

## 2022-07-28 ASSESSMENT — AXIALLENGTH_DERIVED
OS_AL: 25.3663
OD_AL: 25.7566

## 2022-07-28 ASSESSMENT — SPHEQUIV_DERIVED
OS_SPHEQUIV: -4
OD_SPHEQUIV: -4.5

## 2022-07-28 ASSESSMENT — TONOMETRY
OD_IOP_MMHG: 18
OS_IOP_MMHG: 19

## 2023-01-17 NOTE — ED PEDIATRIC TRIAGE NOTE - TEMP(CELSIUS)
Enrolled with [employer] - Ordered and being shipped to patient's home address on file. ETA within 5-7 business days. Message  Received: Today  Porter Hunter, LENIN Ding; Luigi Balderas  Please order this young lady a 7 day extended holter for tachy/palps, thank you!
36.8

## 2024-02-13 NOTE — ED PEDIATRIC TRIAGE NOTE - HEART RATE (BEATS/MIN)
78 Albuterol (Eqv-ProAir HFA) 90 mcg/inh inhalation aerosol: 2 puff(s) inhaled every 4 to 6 hours  aspirin 81 mg oral tablet, chewable: 1 tab(s) chewed once a day  cetirizine 10 mg oral tablet: 1 tab(s) orally once a day  cholecalciferol 25 mcg (1000 intl units) oral tablet: 1 tab(s) orally once a day  famotidine 20 mg oral tablet: 1 tab(s) orally once a day  hydroxychloroquine 200 mg oral tablet: 1 tab(s) orally once a day  Lovenox 40 mg/0.4 mL injectable solution: 0.4 milliliter(s) subcutaneously once a day  magnesium oxide 400 mg oral tablet: 2 tab(s) orally 2 times a day with meals  prednisoLONE (as sodium phosphate) 15 mg/5 mL oral liquid: 7 milliliter(s) orally once a day  Prenatal Multivitamins oral tablet: 1 tab(s) orally once a day  pyridoxine 50 mg oral tablet: 1 tab(s) orally once a day  Trinatal Rx 1 oral tablet: 1 tab(s) orally once a day   Albuterol (Eqv-ProAir HFA) 90 mcg/inh inhalation aerosol: 2 puff(s) inhaled every 4 to 6 hours  aspirin 81 mg oral tablet, chewable: 1 tab(s) chewed once a day  cetirizine 10 mg oral tablet: 1 tab(s) orally once a day  cholecalciferol 25 mcg (1000 intl units) oral tablet: 1 tab(s) orally once a day  famotidine 20 mg oral tablet: 1 tab(s) orally once a day  hydroxychloroquine 200 mg oral tablet: 1 tab(s) orally once a day  Lovenox 40 mg/0.4 mL injectable solution: 0.4 milliliter(s) subcutaneously once a day  magnesium oxide 400 mg oral tablet: 2 tab(s) orally 2 times a day with meals  predniSONE 20 mg oral tablet: 2 tab(s) orally once a day  Prenatal Multivitamins oral tablet: 1 tab(s) orally once a day  pyridoxine 50 mg oral tablet: 1 tab(s) orally once a day  Trinatal Rx 1 oral tablet: 1 tab(s) orally once a day   Skin Substitute Text: The defect edges were debeveled with a #15 scalpel blade.  Given the location of the defect, shape of the defect and the proximity to free margins a skin substitute graft was deemed most appropriate.  The graft material was trimmed to fit the size of the defect. The graft was then placed in the primary defect and oriented appropriately.

## 2024-11-11 NOTE — ED PROVIDER NOTE - PROVIDER TOKENS
Detail Level: Detailed
Quality 130: Documentation Of Current Medications In The Medical Record: Current Medications Documented
PROVIDER:[TOKEN:[[id number]]]
